# Patient Record
Sex: MALE | Race: WHITE | NOT HISPANIC OR LATINO | Employment: FULL TIME | URBAN - METROPOLITAN AREA
[De-identification: names, ages, dates, MRNs, and addresses within clinical notes are randomized per-mention and may not be internally consistent; named-entity substitution may affect disease eponyms.]

---

## 2018-06-22 ENCOUNTER — APPOINTMENT (OUTPATIENT)
Dept: LAB | Facility: CLINIC | Age: 28
End: 2018-06-22
Payer: COMMERCIAL

## 2018-06-22 ENCOUNTER — OFFICE VISIT (OUTPATIENT)
Dept: FAMILY MEDICINE CLINIC | Facility: CLINIC | Age: 28
End: 2018-06-22
Payer: COMMERCIAL

## 2018-06-22 VITALS
DIASTOLIC BLOOD PRESSURE: 78 MMHG | TEMPERATURE: 97.9 F | HEIGHT: 72 IN | WEIGHT: 174.2 LBS | BODY MASS INDEX: 23.6 KG/M2 | HEART RATE: 76 BPM | SYSTOLIC BLOOD PRESSURE: 120 MMHG | OXYGEN SATURATION: 98 %

## 2018-06-22 DIAGNOSIS — Z00.00 ENCOUNTER FOR PREVENTATIVE ADULT HEALTH CARE EXAMINATION: ICD-10-CM

## 2018-06-22 DIAGNOSIS — Z13.220 SCREENING FOR HYPERLIPIDEMIA: ICD-10-CM

## 2018-06-22 DIAGNOSIS — E55.9 VITAMIN D DEFICIENCY: ICD-10-CM

## 2018-06-22 DIAGNOSIS — G44.89 OTHER HEADACHE SYNDROME: ICD-10-CM

## 2018-06-22 DIAGNOSIS — G47.33 OBSTRUCTIVE SLEEP APNEA SYNDROME: ICD-10-CM

## 2018-06-22 DIAGNOSIS — Z13.1 SCREENING FOR DIABETES MELLITUS: ICD-10-CM

## 2018-06-22 DIAGNOSIS — R53.83 OTHER FATIGUE: ICD-10-CM

## 2018-06-22 DIAGNOSIS — R06.83 SNORING: ICD-10-CM

## 2018-06-22 DIAGNOSIS — R00.0 HEART RATE FAST: ICD-10-CM

## 2018-06-22 DIAGNOSIS — I45.9 SKIPPED HEART BEATS: Primary | ICD-10-CM

## 2018-06-22 DIAGNOSIS — I45.9 SKIPPED HEART BEATS: ICD-10-CM

## 2018-06-22 DIAGNOSIS — Z98.890 HISTORY OF NASAL SURGERY: ICD-10-CM

## 2018-06-22 LAB
25(OH)D3 SERPL-MCNC: 14.7 NG/ML (ref 30–100)
ALBUMIN SERPL BCP-MCNC: 4.8 G/DL (ref 3.5–5)
ALP SERPL-CCNC: 62 U/L (ref 46–116)
ALT SERPL W P-5'-P-CCNC: 32 U/L (ref 12–78)
ANION GAP SERPL CALCULATED.3IONS-SCNC: 8 MMOL/L (ref 4–13)
AST SERPL W P-5'-P-CCNC: 17 U/L (ref 5–45)
BASOPHILS # BLD AUTO: 0.03 THOUSANDS/ΜL (ref 0–0.1)
BASOPHILS NFR BLD AUTO: 1 % (ref 0–1)
BILIRUB SERPL-MCNC: 0.88 MG/DL (ref 0.2–1)
BILIRUB UR QL STRIP: NEGATIVE
BUN SERPL-MCNC: 13 MG/DL (ref 5–25)
CALCIUM SERPL-MCNC: 9.3 MG/DL (ref 8.3–10.1)
CHLORIDE SERPL-SCNC: 103 MMOL/L (ref 100–108)
CHOLEST SERPL-MCNC: 140 MG/DL (ref 50–200)
CLARITY UR: CLEAR
CO2 SERPL-SCNC: 29 MMOL/L (ref 21–32)
COLOR UR: YELLOW
CREAT SERPL-MCNC: 0.84 MG/DL (ref 0.6–1.3)
EOSINOPHIL # BLD AUTO: 0.03 THOUSAND/ΜL (ref 0–0.61)
EOSINOPHIL NFR BLD AUTO: 1 % (ref 0–6)
ERYTHROCYTE [DISTWIDTH] IN BLOOD BY AUTOMATED COUNT: 12.4 % (ref 11.6–15.1)
GFR SERPL CREATININE-BSD FRML MDRD: 120 ML/MIN/1.73SQ M
GLUCOSE P FAST SERPL-MCNC: 83 MG/DL (ref 65–99)
GLUCOSE UR STRIP-MCNC: NEGATIVE MG/DL
HCT VFR BLD AUTO: 49.6 % (ref 36.5–49.3)
HDLC SERPL-MCNC: 44 MG/DL (ref 40–60)
HGB BLD-MCNC: 16.3 G/DL (ref 12–17)
HGB UR QL STRIP.AUTO: NEGATIVE
IMM GRANULOCYTES # BLD AUTO: 0.03 THOUSAND/UL (ref 0–0.2)
IMM GRANULOCYTES NFR BLD AUTO: 1 % (ref 0–2)
KETONES UR STRIP-MCNC: NEGATIVE MG/DL
LDLC SERPL CALC-MCNC: 79 MG/DL (ref 0–100)
LEUKOCYTE ESTERASE UR QL STRIP: NEGATIVE
LYMPHOCYTES # BLD AUTO: 1.53 THOUSANDS/ΜL (ref 0.6–4.47)
LYMPHOCYTES NFR BLD AUTO: 23 % (ref 14–44)
MCH RBC QN AUTO: 28.9 PG (ref 26.8–34.3)
MCHC RBC AUTO-ENTMCNC: 32.9 G/DL (ref 31.4–37.4)
MCV RBC AUTO: 88 FL (ref 82–98)
MONOCYTES # BLD AUTO: 0.47 THOUSAND/ΜL (ref 0.17–1.22)
MONOCYTES NFR BLD AUTO: 7 % (ref 4–12)
NEUTROPHILS # BLD AUTO: 4.53 THOUSANDS/ΜL (ref 1.85–7.62)
NEUTS SEG NFR BLD AUTO: 67 % (ref 43–75)
NITRITE UR QL STRIP: NEGATIVE
NONHDLC SERPL-MCNC: 96 MG/DL
NRBC BLD AUTO-RTO: 0 /100 WBCS
PH UR STRIP.AUTO: 7 [PH] (ref 4.5–8)
PLATELET # BLD AUTO: 280 THOUSANDS/UL (ref 149–390)
PMV BLD AUTO: 9.5 FL (ref 8.9–12.7)
POTASSIUM SERPL-SCNC: 4.4 MMOL/L (ref 3.5–5.3)
PROT SERPL-MCNC: 8.4 G/DL (ref 6.4–8.2)
PROT UR STRIP-MCNC: NEGATIVE MG/DL
RBC # BLD AUTO: 5.64 MILLION/UL (ref 3.88–5.62)
SODIUM SERPL-SCNC: 140 MMOL/L (ref 136–145)
SP GR UR STRIP.AUTO: 1.02 (ref 1–1.03)
TRIGL SERPL-MCNC: 83 MG/DL
TSH SERPL DL<=0.05 MIU/L-ACNC: 0.91 UIU/ML (ref 0.36–3.74)
UROBILINOGEN UR QL STRIP.AUTO: 0.2 E.U./DL
WBC # BLD AUTO: 6.62 THOUSAND/UL (ref 4.31–10.16)

## 2018-06-22 PROCEDURE — 80061 LIPID PANEL: CPT

## 2018-06-22 PROCEDURE — 81003 URINALYSIS AUTO W/O SCOPE: CPT | Performed by: NURSE PRACTITIONER

## 2018-06-22 PROCEDURE — 80053 COMPREHEN METABOLIC PANEL: CPT | Performed by: NURSE PRACTITIONER

## 2018-06-22 PROCEDURE — 85025 COMPLETE CBC W/AUTO DIFF WBC: CPT

## 2018-06-22 PROCEDURE — 99204 OFFICE O/P NEW MOD 45 MIN: CPT | Performed by: NURSE PRACTITIONER

## 2018-06-22 PROCEDURE — 93000 ELECTROCARDIOGRAM COMPLETE: CPT | Performed by: NURSE PRACTITIONER

## 2018-06-22 PROCEDURE — 84443 ASSAY THYROID STIM HORMONE: CPT

## 2018-06-22 PROCEDURE — 82306 VITAMIN D 25 HYDROXY: CPT

## 2018-06-22 PROCEDURE — 36415 COLL VENOUS BLD VENIPUNCTURE: CPT | Performed by: NURSE PRACTITIONER

## 2018-06-22 RX ORDER — MULTIVIT WITH MINERALS/LUTEIN
1000 TABLET ORAL DAILY
COMMUNITY

## 2018-06-22 NOTE — PROGRESS NOTES
Assessment/Plan:    No problem-specific Assessment & Plan notes found for this encounter  Diagnoses and all orders for this visit:    Skipped heart beats  Comments:  EKG completed NSR no ectopy   Echocardiagram ordered  Orders:  -     CBC and differential; Future  -     UA w Reflex to Microscopic w Reflex to Culture  -     POCT ECG  -     Diagnostic Sleep Study; Future  -     Echo complete with contrast if indicated; Future    Heart rate fast  Comments:  EKG completed NSR no ectopy   Echocardiagram ordered  Orders:  -     CBC and differential; Future  -     POCT ECG  -     Diagnostic Sleep Study; Future  -     Echo complete with contrast if indicated; Future    Encounter for preventative adult health care examination  Comments:  Labwork ordered    Screening for hyperlipidemia  Comments:  Labwork ordered  Orders:  -     Lipid panel; Future    Screening for diabetes mellitus  Comments:  Labwork ordered  Orders:  -     Comprehensive metabolic panel    Vitamin D deficiency  Comments:  Labwork ordered  Orders:  -     Vitamin D 25 hydroxy; Future    Other fatigue  Comments:  Labwork ordered  Orders:  -     CBC and differential; Future  -     TSH, 3rd generation with Free T4 reflex; Future  -     UA w Reflex to Microscopic w Reflex to Culture  -     Diagnostic Sleep Study; Future    Obstructive sleep apnea syndrome  Comments:  Sleep study ordered  Orders:  -     Diagnostic Sleep Study; Future    Snoring  Comments:  Sleep study ordered  Orders:  -     Diagnostic Sleep Study; Future    History of nasal surgery  Comments:  Referred to ENT for persistent nasal stuffiness  Orders:  -     Ambulatory Referral to Otolaryngology; Future    Other headache syndrome  Comments:  Sleep study ordered, referral to ENT    Other orders  -     Ascorbic Acid (VITAMIN C) 1000 MG tablet; Take 1,000 mg by mouth daily          Subjective:      Patient ID: Ulises Matamoros is a 32 y o  male  here to [facility]   Pt offers complaint of excess daytime sleepiness, snoring, irregular heartbeat and difficulty getting air in his nose due to hx of nasal fx surgery    HPI    The following portions of the patient's history were reviewed and updated as appropriate:   He  has no past medical history on file  He   Patient Active Problem List    Diagnosis Date Noted    Heart rate fast 06/22/2018    Snoring 06/22/2018    Obstructive sleep apnea syndrome 06/22/2018    History of nasal surgery 06/22/2018    Other headache syndrome 06/22/2018     He  has a past surgical history that includes Appendectomy  His family history includes Bone cancer in his brother; Diabetes in his cousin and maternal grandfather; Thyroid cancer in his maternal grandmother  He  reports that he has never smoked  He has never used smokeless tobacco  He reports that he drinks about 8 4 oz of alcohol per week   He reports that he does not use drugs  Current Outpatient Prescriptions   Medication Sig Dispense Refill    Ascorbic Acid (VITAMIN C) 1000 MG tablet Take 1,000 mg by mouth daily       No current facility-administered medications for this visit       Review of Systems   Constitutional: Negative  Negative for appetite change and fatigue  HENT: Negative  Negative for congestion, ear pain, rhinorrhea and sinus pain  Eyes: Negative  Negative for pain, itching and visual disturbance  Respiratory: Negative  Negative for cough, shortness of breath and wheezing  Cardiovascular: Negative  Negative for chest pain, palpitations and leg swelling  Gastrointestinal: Negative  Negative for abdominal pain, constipation, diarrhea, nausea and vomiting  Endocrine: Negative  Negative for polydipsia, polyphagia and polyuria  Genitourinary: Negative  Negative for difficulty urinating, frequency and urgency  Musculoskeletal: Negative  Negative for back pain and joint swelling  Skin: Negative  Negative for rash and wound  Allergic/Immunologic: Negative      Neurological: Negative  Negative for dizziness, weakness and headaches  Hematological: Negative  Negative for adenopathy  Does not bruise/bleed easily  Psychiatric/Behavioral: Negative  Negative for behavioral problems and sleep disturbance  The patient is not nervous/anxious  Objective:      /78 (BP Location: Left arm, Patient Position: Sitting, Cuff Size: Adult)   Pulse 76   Temp 97 9 °F (36 6 °C) (Tympanic)   Ht 6' (1 829 m)   Wt 79 kg (174 lb 3 2 oz)   SpO2 98%   BMI 23 63 kg/m²          Physical Exam   Constitutional: He is oriented to person, place, and time  He appears well-developed and well-nourished  HENT:   Head: Normocephalic  Eyes: Conjunctivae and EOM are normal  Pupils are equal, round, and reactive to light  Neck: Normal range of motion  Neck supple  Cardiovascular: Normal rate and regular rhythm  Pulmonary/Chest: Effort normal and breath sounds normal    Abdominal: Soft  Bowel sounds are normal    Musculoskeletal: Normal range of motion  Neurological: He is alert and oriented to person, place, and time  Skin: Skin is warm and dry  Psychiatric: He has a normal mood and affect   His behavior is normal  Judgment and thought content normal

## 2018-08-06 DIAGNOSIS — G47.9 FATIGUE DUE TO SLEEP PATTERN DISTURBANCE: Primary | ICD-10-CM

## 2018-08-06 DIAGNOSIS — R53.83 FATIGUE DUE TO SLEEP PATTERN DISTURBANCE: Primary | ICD-10-CM

## 2018-08-21 ENCOUNTER — TELEPHONE (OUTPATIENT)
Dept: UROLOGY | Facility: AMBULATORY SURGERY CENTER | Age: 28
End: 2018-08-21

## 2018-08-21 NOTE — TELEPHONE ENCOUNTER
Patient needs a new patient vasectomy consult appointment  Reason for appointment/Complaint/Diagnosis : consult for a vasectomy    Insurance: [employer]    History of Cancer? no                       If yes, what kind? none    Previous urologist?     no                  Records requested/where? [facility]    Outside testing/where? no    Location Preference for office visit? [facility], [address] new patient paperwork via email

## 2018-08-24 ENCOUNTER — HOSPITAL ENCOUNTER (OUTPATIENT)
Dept: SLEEP CENTER | Facility: HOSPITAL | Age: 28
Discharge: HOME | End: 2018-08-24
Payer: COMMERCIAL

## 2018-08-24 DIAGNOSIS — R53.83 FATIGUE DUE TO SLEEP PATTERN DISTURBANCE: ICD-10-CM

## 2018-08-24 DIAGNOSIS — G47.9 FATIGUE DUE TO SLEEP PATTERN DISTURBANCE: ICD-10-CM

## 2018-08-24 PROCEDURE — G0399 HOME SLEEP TEST/TYPE 3 PORTA: HCPCS

## 2018-08-30 ENCOUNTER — OFFICE VISIT (OUTPATIENT)
Dept: UROLOGY | Facility: AMBULATORY SURGERY CENTER | Age: 28
End: 2018-08-30
Payer: COMMERCIAL

## 2018-08-30 VITALS
HEIGHT: 72 IN | BODY MASS INDEX: 24.65 KG/M2 | DIASTOLIC BLOOD PRESSURE: 76 MMHG | WEIGHT: 182 LBS | HEART RATE: 72 BPM | SYSTOLIC BLOOD PRESSURE: 122 MMHG

## 2018-08-30 DIAGNOSIS — Z30.2 ENCOUNTER FOR STERILIZATION: Primary | ICD-10-CM

## 2018-08-30 DIAGNOSIS — G47.33 OSA (OBSTRUCTIVE SLEEP APNEA): Primary | ICD-10-CM

## 2018-08-30 PROCEDURE — 99243 OFF/OP CNSLTJ NEW/EST LOW 30: CPT | Performed by: UROLOGY

## 2018-08-30 RX ORDER — LORAZEPAM 2 MG/1
2 TABLET ORAL ONCE
Qty: 1 TABLET | Refills: 0 | Status: SHIPPED | OUTPATIENT
Start: 2018-08-30 | End: 2018-08-30

## 2018-08-30 NOTE — PROGRESS NOTES
8/30/2018    Grzegorz Murphy  1990  [id number]        My impression is request for elective sterilization vasectomy  Risk and benefits of the procedure were discussed and reviewed  Informed consent was obtained in the office today  The patient was prescribed Ativan to take one hour prior to the procedure  He understands that he will require transportation to and from the office that day  He also understands he will require 2 semen analyses at 6 and 8 weeks post procedure  In the interim he will require contraception to avoid an undesired pregnancy  History of Present Illness  29 y o  male who requests elective sterilization with vasectomy  He has been  for 1 5 years  He has 1 daughter with his previous wife  She is 5month-old  His current wife has 2 children  The patient states that he and his new wife are absolute in that they do not want to have children together  Risk and benefits of the vasectomy were discussed and reviewed in the office today  The patient understands that vasectomy reversal is not always 100% effective and can carry with it a significant out of pocket expense  He denies any lower urinary tract symptoms  AUA Symptom Score      Review of Systems  Review of Systems   Constitutional: Negative  HENT: Negative  Eyes: Negative  Respiratory: Negative  Cardiovascular: Negative  Gastrointestinal: Negative  Endocrine: Negative  Genitourinary: Negative  Musculoskeletal: Negative  Skin: Negative  Allergic/Immunologic: Negative  Neurological: Negative  Hematological: Negative  Psychiatric/Behavioral: Negative  Past Medical History  History reviewed  No pertinent past medical history      Past Social History  Past Surgical History:   Procedure Laterality Date    APPENDECTOMY      CLOSED REDUCTION NASAL FRACTURE         Past Family History  Family History   Problem Relation Age of Onset    Bone cancer Brother     Thyroid cancer Maternal Grandmother     Diabetes Maternal Grandfather     Diabetes Cousin     COPD Father        Past Social history  Social History     Social History    Marital status: /Civil Union     Spouse name: N/A    Number of children: N/A    Years of education: N/A     Occupational History    Not on file  Social History Main Topics    Smoking status: Never Smoker    Smokeless tobacco: Never Used    Alcohol use 8 4 oz/week     14 Cans of beer per week    Drug use: No    Sexual activity: Yes     Partners: Female     Other Topics Concern    Not on file     Social History Narrative    No narrative on file       Current Medications  Current Outpatient Prescriptions   Medication Sig Dispense Refill    Ascorbic Acid (VITAMIN C) 1000 MG tablet Take 1,000 mg by mouth daily      LORazepam (ATIVAN) 2 mg tablet Take 1 tablet (2 mg total) by mouth once for 1 dose Take one hour prior to procedure 1 tablet 0     No current facility-administered medications for this visit  Allergies  Allergies   Allergen Reactions    Sulfa Antibiotics Hives       Past Medical History, Social History, Family History, medications and allergies were reviewed  Vitals  Vitals:    08/30/18 0833   BP: 122/76   Pulse: 72   Weight: 82 6 kg (182 lb)   Height: 6' (1 829 m)       Physical Exam  Physical Exam    On examination he is in no acute distress  His abdomen is soft nontender nondistended   examination reveals no inguinal hernias  Phallus, scrotum and scrotal contents are normal   Testes are properly descended into the scrotum  Vasa deferentia easily palpable in the midline  Mild left-sided varicoceles appreciated  Skin is warm  Extremities without edema    Neurologic is grossly intact and nonfocal   Gait normal   Affect normal    Results  No results found for: PSA  Lab Results   Component Value Date    CALCIUM 9 3 06/22/2018     06/22/2018    K 4 4 06/22/2018    CO2 29 06/22/2018     06/22/2018    BUN 13 06/22/2018    CREATININE 0 84 06/22/2018     Lab Results   Component Value Date    WBC 6 62 06/22/2018    HGB 16 3 06/22/2018    HCT 49 6 (H) 06/22/2018    MCV 88 06/22/2018     06/22/2018         Office Urine Dip  No results found for this or any previous visit (from the past 1 hour(s)) ]

## 2018-09-07 ENCOUNTER — TRANSCRIBE ORDERS (OUTPATIENT)
Dept: SLEEP CENTER | Facility: CLINIC | Age: 28
End: 2018-09-07

## 2018-10-25 ENCOUNTER — HOSPITAL ENCOUNTER (OUTPATIENT)
Dept: SLEEP CENTER | Facility: HOSPITAL | Age: 28
Discharge: HOME | End: 2018-10-25
Payer: COMMERCIAL

## 2018-10-25 DIAGNOSIS — G47.33 OSA (OBSTRUCTIVE SLEEP APNEA): ICD-10-CM

## 2018-10-25 PROCEDURE — 95811 POLYSOM 6/>YRS CPAP 4/> PARM: CPT

## 2019-05-10 ENCOUNTER — OFFICE VISIT (OUTPATIENT)
Dept: URGENT CARE | Facility: CLINIC | Age: 29
End: 2019-05-10
Payer: COMMERCIAL

## 2019-05-10 VITALS
OXYGEN SATURATION: 96 % | WEIGHT: 186 LBS | SYSTOLIC BLOOD PRESSURE: 110 MMHG | DIASTOLIC BLOOD PRESSURE: 76 MMHG | TEMPERATURE: 98.5 F | BODY MASS INDEX: 25.19 KG/M2 | HEIGHT: 72 IN | HEART RATE: 97 BPM | RESPIRATION RATE: 16 BRPM

## 2019-05-10 DIAGNOSIS — R05.9 COUGH: Primary | ICD-10-CM

## 2019-05-10 PROCEDURE — 99213 OFFICE O/P EST LOW 20 MIN: CPT | Performed by: PHYSICIAN ASSISTANT

## 2019-05-10 RX ORDER — DOXYCYCLINE 100 MG/1
100 TABLET ORAL 2 TIMES DAILY
Qty: 20 TABLET | Refills: 0 | Status: SHIPPED | OUTPATIENT
Start: 2019-05-10 | End: 2019-05-20

## 2019-05-10 RX ORDER — MELATONIN
1000 DAILY
COMMUNITY

## 2019-05-10 RX ORDER — METHYLPREDNISOLONE 4 MG/1
TABLET ORAL
Qty: 1 EACH | Refills: 0 | Status: SHIPPED | OUTPATIENT
Start: 2019-05-10 | End: 2021-11-14 | Stop reason: ALTCHOICE

## 2019-11-28 ENCOUNTER — AMB VIDEO VISIT (OUTPATIENT)
Dept: OTHER | Facility: HOSPITAL | Age: 29
End: 2019-11-28
Payer: COMMERCIAL

## 2019-11-28 PROCEDURE — 99201 PR OFFICE OUTPATIENT NEW 10 MINUTES: CPT | Performed by: FAMILY MEDICINE

## 2019-11-28 NOTE — CARE ANYWHERE EVISITS
Visit Summary for [facility] - Gender: Male - Date of Birth: [id number]  Date: [id number] - Duration: 3 minutes  Patient: Tayo Rogers  Provider: Sergey Vargas    Patient Contact Information  Address  [address] [place] [address]; [address]  [phone]    Visit Topics  sore throat, swollen lymph nodes on right side of neck area, been fighting sinus symptoms for 3 weeks  [Added By: Self - 2019-11-28]    Conversation Transcripts  [0A][0A] [Notification] You are connected with Sergey Vargas, Family Physician [0A][Notification] [facility] [place]  [0A][Notification] [facility] has shared health history  Garry Clark  [0A][Notification] Sergey Vargas has added a   diagnosis/procedure code (see the "Visit Notes" tab)  [0A][Notification] Sergey Vargas has added a prescription (see the "Visit Notes" tab)  [0A]    Diagnosis  Acute sinusitis, unspecified    Procedures    Medications Prescribed    Augmentin  Dose : 1 tablet  Route : oral  Frequency : every 12 hours  Refills : 0  Instructions to the Pharmacist : Substitutions allowed      Provider Notes  [0A][0A] [0A]We strongly encourage you to share the following record of today's visit with your primary care physician  [0A][0A][0A][0A]Contact phone number:[0A][0A][0A][0A]Mode of Communication: Faisal Guzman [0A][0A]HPI: The patient has been having   sinus pain, post nasal drip, discolored mucous for more than a week  No fever, some swollen glands on right and has sore throat  There is a moderate cough but no shortness of breath  The patient has been achy and tired    [0A][0A][0A][0A]PMH:   None[0A][0A]PSH: Septoplasty[0A][0A]Smoking HX: Nonsmoker[0A][0A]Meds: Vit C[0A][0A]Allergies: Sulfa[0A][0A][0A][0A]PE: [0A][0A]Gen: Patient is well appearing and in no acute distress   [0A][0A]Eyes: Normal appearing[0A][0A]Nose:   Congested[0A][0A]Sinuses: Sinus tenderness bilaterally[0A][0A]Pharynx: Normal appearing tonsils and posterior pharynx[0A][0A]Resp: No respiratory distress[0A][0A][0A][0A]Assessment: Sinusitis   Diagnosis Code: acute sinusitis NOS J01 90[0A][0A]    [0A][0A]Plan:  [0A][0A]1  Medication as described below  [0A][0A]2  Discussed options and precautions[0A][0A]3  Recommend nasal saline, Neti Pot, plain Mucinex[0A][0A]4  Sleeping with head/chest elevated can help with sinus   drainage[0A][0A][0A][0A]Antibiotic indication: Continued symptoms over 3 weeks[0A][0A]Antibiotic choice: Augmentin 875mg 1 tab twice daily for 7 days[0A][0A][0A][0A]Follow up:[0A][0A]1  Follow up in 5-7 days if not improving  Discussed precautions  [0A][0A]2  If there are any questions or problems with the prescription, call [phone] anytime for assistance  [0A][0A]3  Please re-connect for another online visit or see an in-person provider should your symptoms worsen or not improving 5-7days  [0A][0A]4  Taking a probiotic (either in pill form or by eating yogurt that contains probiotics) while using antibiotics can help prevent some of the troublesome side effects that antibiotics can sometimes cause [0A][0A]5  Please print a copy of this   note and send it to your regular doctor, or take it to your next visit so it may be included in your medical record   [0A][0A][0A][0A]Patient voiced understanding and agrees to plan [0A][0A][0A][0A]Please see your PCP on an annual basis[0A]    Electronically signed by: Beti Muniz(NPI [id number])

## 2019-12-10 ENCOUNTER — OFFICE VISIT (OUTPATIENT)
Dept: FAMILY MEDICINE CLINIC | Facility: CLINIC | Age: 29
End: 2019-12-10
Payer: COMMERCIAL

## 2019-12-10 VITALS
OXYGEN SATURATION: 98 % | BODY MASS INDEX: 26.03 KG/M2 | SYSTOLIC BLOOD PRESSURE: 118 MMHG | TEMPERATURE: 98.8 F | HEART RATE: 111 BPM | HEIGHT: 72 IN | WEIGHT: 192.2 LBS | DIASTOLIC BLOOD PRESSURE: 78 MMHG

## 2019-12-10 DIAGNOSIS — J01.81 OTHER ACUTE RECURRENT SINUSITIS: ICD-10-CM

## 2019-12-10 DIAGNOSIS — Z98.890 HISTORY OF NASAL SURGERY: ICD-10-CM

## 2019-12-10 DIAGNOSIS — R53.83 FATIGUE, UNSPECIFIED TYPE: Primary | ICD-10-CM

## 2019-12-10 DIAGNOSIS — Z13.1 SCREENING FOR DIABETES MELLITUS: ICD-10-CM

## 2019-12-10 DIAGNOSIS — Z13.29 SCREENING FOR THYROID DISORDER: ICD-10-CM

## 2019-12-10 DIAGNOSIS — Z13.31 DEPRESSION SCREENING NEGATIVE: ICD-10-CM

## 2019-12-10 DIAGNOSIS — J02.0 PHARYNGITIS DUE TO STREPTOCOCCUS SPECIES: Primary | ICD-10-CM

## 2019-12-10 DIAGNOSIS — Z13.220 SCREENING FOR HYPERLIPIDEMIA: ICD-10-CM

## 2019-12-10 DIAGNOSIS — E55.9 VITAMIN D DEFICIENCY: ICD-10-CM

## 2019-12-10 LAB — S PYO AG THROAT QL: NEGATIVE

## 2019-12-10 PROCEDURE — 87880 STREP A ASSAY W/OPTIC: CPT | Performed by: NURSE PRACTITIONER

## 2019-12-10 PROCEDURE — 99213 OFFICE O/P EST LOW 20 MIN: CPT | Performed by: NURSE PRACTITIONER

## 2019-12-10 PROCEDURE — 87070 CULTURE OTHR SPECIMN AEROBIC: CPT | Performed by: NURSE PRACTITIONER

## 2019-12-10 PROCEDURE — 87147 CULTURE TYPE IMMUNOLOGIC: CPT | Performed by: NURSE PRACTITIONER

## 2019-12-10 PROCEDURE — 1036F TOBACCO NON-USER: CPT | Performed by: NURSE PRACTITIONER

## 2019-12-10 PROCEDURE — 3008F BODY MASS INDEX DOCD: CPT | Performed by: NURSE PRACTITIONER

## 2019-12-10 RX ORDER — TRIAMCINOLONE ACETONIDE 40 MG/ML
40 INJECTION, SUSPENSION INTRA-ARTICULAR; INTRAMUSCULAR ONCE
Status: COMPLETED | OUTPATIENT
Start: 2019-12-10 | End: 2019-12-10

## 2019-12-10 RX ORDER — AZITHROMYCIN 250 MG/1
TABLET, FILM COATED ORAL
Qty: 6 TABLET | Refills: 1 | Status: SHIPPED | OUTPATIENT
Start: 2019-12-10 | End: 2019-12-14

## 2019-12-10 RX ORDER — METHYLPREDNISOLONE 4 MG/1
TABLET ORAL
Qty: 21 EACH | Refills: 0 | Status: SHIPPED | OUTPATIENT
Start: 2019-12-10 | End: 2021-11-14 | Stop reason: ALTCHOICE

## 2019-12-10 RX ADMIN — TRIAMCINOLONE ACETONIDE 40 MG: 40 INJECTION, SUSPENSION INTRA-ARTICULAR; INTRAMUSCULAR at 17:30

## 2019-12-10 NOTE — PROGRESS NOTES
Assessment/Plan:    No problem-specific Assessment & Plan notes found for this encounter  Diagnoses and all orders for this visit:    Pharyngitis due to Streptococcus species  Comments:  Rx for Medrol dose pack , POCT Kenalog provided, throat culture obtained and Z pack ordered  Orders:  -     azithromycin (ZITHROMAX) 250 mg tablet; Take 2 tablets today then 1 tablet daily x 4 days  -     triamcinolone acetonide (KENALOG-40) 40 mg/mL injection 40 mg  -     methylPREDNISolone 4 MG tablet therapy pack; Use as directed on package  -     POCT rapid strepA  -     Throat culture; Future  -     Throat culture    BMI 25 0-25 9,adult    History of nasal surgery    Depression screening negative    Other acute recurrent sinusitis  Comments:  Rx for Medrol dose pack , POCT Kenalog provided, throat culture obtained and Z pack ordered  Orders:  -     azithromycin (ZITHROMAX) 250 mg tablet; Take 2 tablets today then 1 tablet daily x 4 days  -     triamcinolone acetonide (KENALOG-40) 40 mg/mL injection 40 mg  -     methylPREDNISolone 4 MG tablet therapy pack; Use as directed on package          Subjective:      Patient ID: Chandler Valle is a 34 y o  male  Sore Throat  Patient complains of sore throat  Associated symptoms include enlarged tonsils, nasal blockage, post nasal drip, sinus and nasal congestion, sore throat, swollen glands and lymphadenopathy  Onset of symptoms was 3 weeks ago, and have been unchanged since that time  He is drinking plenty of fluids  He has not had recent close exposure to someone with proven streptococcal pharyngitis  Pt states he was treated with Augmentin and fever did resolve however swollen lymph glands and sore throat are not improved and are impressive upon visualization  with greenish exudate noted on tonsils  Throat culture obtained, Rx for Z pack provided           The following portions of the patient's history were reviewed and updated as appropriate: He  has a past medical history of Sleep apnea and Vitamin D deficiency  He   Patient Active Problem List    Diagnosis Date Noted    BMI 25 0-25 9,adult 12/10/2019    Depression screening negative 12/10/2019    Other acute recurrent sinusitis 12/10/2019    Pharyngitis due to Streptococcus species 12/10/2019    Fatigue due to sleep pattern disturbance     Heart rate fast 06/22/2018    Snoring 06/22/2018    Obstructive sleep apnea syndrome 06/22/2018    History of nasal surgery 06/22/2018    Other headache syndrome 06/22/2018     He  has a past surgical history that includes Appendectomy and Closed reduction nasal fracture  His family history includes Bone cancer in his brother; COPD in his father; Diabetes in his cousin and maternal grandfather; Thyroid cancer in his maternal grandmother  He  reports that he has never smoked  He has never used smokeless tobacco  He reports that he drinks about 14 0 standard drinks of alcohol per week  He reports that he does not use drugs  Current Outpatient Medications   Medication Sig Dispense Refill    Ascorbic Acid (VITAMIN C) 1000 MG tablet Take 1,000 mg by mouth daily      cholecalciferol (VITAMIN D3) 1,000 units tablet Take 1,000 Units by mouth daily      azithromycin (ZITHROMAX) 250 mg tablet Take 2 tablets today then 1 tablet daily x 4 days 6 tablet 1    LORazepam (ATIVAN) 2 mg tablet Take 1 tablet (2 mg total) by mouth once for 1 dose Take one hour prior to procedure 1 tablet 0    methylPREDNISolone 4 MG tablet therapy pack Use as directed on package (Patient not taking: Reported on 12/10/2019) 1 each 0    methylPREDNISolone 4 MG tablet therapy pack Use as directed on package 21 each 0     No current facility-administered medications for this visit        Current Outpatient Medications on File Prior to Visit   Medication Sig    Ascorbic Acid (VITAMIN C) 1000 MG tablet Take 1,000 mg by mouth daily    cholecalciferol (VITAMIN D3) 1,000 units tablet Take 1,000 Units by mouth daily    LORazepam (ATIVAN) 2 mg tablet Take 1 tablet (2 mg total) by mouth once for 1 dose Take one hour prior to procedure    methylPREDNISolone 4 MG tablet therapy pack Use as directed on package (Patient not taking: Reported on 12/10/2019)     No current facility-administered medications on file prior to visit  He is allergic to sulfa antibiotics       Review of Systems   Constitutional: Negative for chills and fever  HENT: Positive for congestion, hearing loss, postnasal drip, rhinorrhea, sinus pressure, sinus pain, sneezing, sore throat and trouble swallowing  Respiratory: Positive for cough  Neurological: Positive for headaches  BMI Counseling: Body mass index is 26 07 kg/m²  Discussed the patient's BMI with him  The BMI is above normal  Nutrition recommendations include reducing portion sizes, decreasing overall calorie intake, 3-5 servings of fruits/vegetables daily, reducing fast food intake, consuming healthier snacks, decreasing soda and/or juice intake, moderation in carbohydrate intake, increasing intake of lean protein, reducing intake of saturated fat and trans fat and reducing intake of cholesterol  Exercise recommendations include exercising 3-5 times per week  PHQ-9 Depression Screening    PHQ-9:    Frequency of the following problems over the past two weeks:       Little interest or pleasure in doing things:  0 - not at all  Feeling down, depressed, or hopeless:  0 - not at all  PHQ-2 Score:  0        Depression Screening Follow-up Plan: Patient's depression screening was negative with a PHQ-2 score of 0    Clinically patient does not have depression  No treatment is required  Objective:      /78   Pulse (!) 111   Temp 98 8 °F (37 1 °C) (Tympanic)   Ht 6' (1 829 m)   Wt 87 2 kg (192 lb 3 2 oz)   SpO2 98%   BMI 26 07 kg/m²          Physical Exam   Constitutional: He appears ill  HENT:   Right Ear: Tympanic membrane is not erythematous   A middle ear effusion is present  Left Ear: Tympanic membrane is not erythematous  A middle ear effusion is present  Nose: Mucosal edema and rhinorrhea present  Right sinus exhibits no maxillary sinus tenderness and no frontal sinus tenderness  Left sinus exhibits no maxillary sinus tenderness and no frontal sinus tenderness  Mouth/Throat: Posterior oropharyngeal erythema present  Tonsils are 3+ on the right  Tonsils are 3+ on the left  Tonsillar exudate  Pulmonary/Chest: Effort normal and breath sounds normal    Lymphadenopathy:        Head (right side): Submandibular adenopathy present  Head (left side): Submandibular adenopathy present

## 2019-12-14 LAB — BACTERIA THROAT CULT: ABNORMAL

## 2020-01-22 DIAGNOSIS — J11.1 INFLUENZA: Primary | ICD-10-CM

## 2020-01-22 RX ORDER — OSELTAMIVIR PHOSPHATE 75 MG/1
75 CAPSULE ORAL 2 TIMES DAILY
Qty: 10 CAPSULE | Refills: 0 | Status: SHIPPED | OUTPATIENT
Start: 2020-01-22 | End: 2020-01-27

## 2020-04-20 ENCOUNTER — TELEMEDICINE (OUTPATIENT)
Dept: FAMILY MEDICINE CLINIC | Facility: CLINIC | Age: 30
End: 2020-04-20
Payer: COMMERCIAL

## 2020-04-20 DIAGNOSIS — H10.32 ACUTE BACTERIAL CONJUNCTIVITIS OF LEFT EYE: Primary | ICD-10-CM

## 2020-04-20 PROCEDURE — 99213 OFFICE O/P EST LOW 20 MIN: CPT | Performed by: FAMILY MEDICINE

## 2020-04-20 RX ORDER — TOBRAMYCIN 3 MG/ML
2 SOLUTION/ DROPS OPHTHALMIC
Qty: 1 BOTTLE | Refills: 1 | Status: SHIPPED | OUTPATIENT
Start: 2020-04-20

## 2021-04-14 DIAGNOSIS — Z23 ENCOUNTER FOR IMMUNIZATION: ICD-10-CM

## 2021-05-06 ENCOUNTER — IMMUNIZATIONS (OUTPATIENT)
Dept: FAMILY MEDICINE CLINIC | Facility: HOSPITAL | Age: 31
End: 2021-05-06

## 2021-05-06 DIAGNOSIS — Z23 ENCOUNTER FOR IMMUNIZATION: Primary | ICD-10-CM

## 2021-05-06 PROCEDURE — 0001A SARS-COV-2 / COVID-19 MRNA VACCINE (PFIZER-BIONTECH) 30 MCG: CPT

## 2021-05-06 PROCEDURE — 91300 SARS-COV-2 / COVID-19 MRNA VACCINE (PFIZER-BIONTECH) 30 MCG: CPT

## 2021-05-27 ENCOUNTER — IMMUNIZATIONS (OUTPATIENT)
Dept: FAMILY MEDICINE CLINIC | Facility: HOSPITAL | Age: 31
End: 2021-05-27

## 2021-05-27 DIAGNOSIS — Z23 ENCOUNTER FOR IMMUNIZATION: Primary | ICD-10-CM

## 2021-05-27 PROCEDURE — 0002A SARS-COV-2 / COVID-19 MRNA VACCINE (PFIZER-BIONTECH) 30 MCG: CPT

## 2021-05-27 PROCEDURE — 91300 SARS-COV-2 / COVID-19 MRNA VACCINE (PFIZER-BIONTECH) 30 MCG: CPT

## 2021-11-14 ENCOUNTER — OFFICE VISIT (OUTPATIENT)
Dept: URGENT CARE | Facility: CLINIC | Age: 31
End: 2021-11-14
Payer: COMMERCIAL

## 2021-11-14 VITALS
HEART RATE: 85 BPM | HEIGHT: 72 IN | OXYGEN SATURATION: 97 % | TEMPERATURE: 98.3 F | SYSTOLIC BLOOD PRESSURE: 132 MMHG | BODY MASS INDEX: 27.09 KG/M2 | RESPIRATION RATE: 18 BRPM | WEIGHT: 200 LBS | DIASTOLIC BLOOD PRESSURE: 77 MMHG

## 2021-11-14 DIAGNOSIS — L23.7 POISON IVY DERMATITIS: Primary | ICD-10-CM

## 2021-11-14 PROCEDURE — G0382 LEV 3 HOSP TYPE B ED VISIT: HCPCS | Performed by: FAMILY MEDICINE

## 2021-11-14 RX ORDER — PREDNISONE 10 MG/1
TABLET ORAL
Qty: 21 TABLET | Refills: 0 | Status: SHIPPED | OUTPATIENT
Start: 2021-11-14

## 2022-09-09 ENCOUNTER — OFFICE VISIT (OUTPATIENT)
Dept: URGENT CARE | Age: 32
End: 2022-09-09
Payer: COMMERCIAL

## 2022-09-09 VITALS
RESPIRATION RATE: 18 BRPM | OXYGEN SATURATION: 99 % | TEMPERATURE: 96.6 F | HEART RATE: 75 BPM | DIASTOLIC BLOOD PRESSURE: 77 MMHG | SYSTOLIC BLOOD PRESSURE: 125 MMHG

## 2022-09-09 DIAGNOSIS — H10.32 ACUTE BACTERIAL CONJUNCTIVITIS OF LEFT EYE: Primary | ICD-10-CM

## 2022-09-09 PROCEDURE — G0382 LEV 3 HOSP TYPE B ED VISIT: HCPCS

## 2022-09-09 RX ORDER — CIPROFLOXACIN HYDROCHLORIDE 3.5 MG/ML
1 SOLUTION/ DROPS TOPICAL 4 TIMES DAILY
Qty: 1.4 ML | Refills: 0 | Status: SHIPPED | OUTPATIENT
Start: 2022-09-09 | End: 2022-09-16

## 2022-09-09 NOTE — PROGRESS NOTES
[address] [facility]        NAME: Mana Medrano is a 28 y o  male  : 1990    MRN: [id number]  DATE: 2022  TIME: 3:00 PM    Assessment and Plan   Acute bacterial conjunctivitis of left eye [H10 32]  1  Acute bacterial conjunctivitis of left eye  ciprofloxacin (CILOXAN) 0 3 % ophthalmic solution         Patient Instructions     Start opthal drops as directed  Discard previous contact lens  Follow up with PCP in 2-3 days  Proceed to ER if symptoms worsen  Chief Complaint     Chief Complaint   Patient presents with    Eye Problem     Pt presents for left eye irration  Pt states since last night eye has been irritated and red  Woke yp this morning and eye was crusty          History of Present Illness     28 y o  M presents with complaint of right eye redness & crusting x 1 day  Denies visual changes or light sensitivity  Denies itching or watering  Denies trauma or injury  No concern for foreign body  +contact lenses  Denies sick contacts  No URI symptoms or fevers  Review of Systems   Review of Systems   Constitutional: Negative for chills, fatigue and fever  HENT: Negative for congestion, ear discharge, ear pain, facial swelling, rhinorrhea, sinus pressure, sinus pain, sore throat and trouble swallowing  Eyes: Positive for discharge and redness  Negative for photophobia, pain, itching and visual disturbance  Respiratory: Negative for cough, chest tightness, shortness of breath and wheezing  Cardiovascular: Negative for chest pain, palpitations and leg swelling  Gastrointestinal: Negative for abdominal pain, diarrhea, nausea and vomiting  Genitourinary: Negative for dysuria, flank pain and hematuria  Musculoskeletal: Negative for arthralgias, back pain, myalgias and neck pain  Skin: Negative for pallor and wound  Neurological: Negative for dizziness, syncope, weakness, numbness and headaches  Psychiatric/Behavioral: Negative for confusion and sleep disturbance  All other systems reviewed and are negative  Current Medications       Current Outpatient Medications:     ciprofloxacin (CILOXAN) 0 3 % ophthalmic solution, Administer 1 drop into the left eye 4 (four) times a day for 7 days, Disp: 1 4 mL, Rfl: 0    Ascorbic Acid (VITAMIN C) 1000 MG tablet, Take 1,000 mg by mouth daily (Patient not taking: Reported on 11/14/2021 ), Disp: , Rfl:     cholecalciferol (VITAMIN D3) 1,000 units tablet, Take 1,000 Units by mouth daily (Patient not taking: Reported on 11/14/2021 ), Disp: , Rfl:     LORazepam (ATIVAN) 2 mg tablet, Take 1 tablet (2 mg total) by mouth once for 1 dose Take one hour prior to procedure, Disp: 1 tablet, Rfl: 0    predniSONE 10 mg tablet, 6 tablets by mouth on day 1, then 5 tablets on day 2, 4 tablets on day 3, 3 tablets on day 4, 2 tablets on day 5, 1 tablet on day 6 (Patient not taking: Reported on 9/9/2022), Disp: 21 tablet, Rfl: 0    Current Allergies     Allergies as of 09/09/2022 - Reviewed 09/09/2022   Allergen Reaction Noted    Sulfa antibiotics Hives 06/22/2018            The following portions of the patient's history were reviewed and updated as appropriate: allergies, current medications, past family history, past medical history, past social history, past surgical history and problem list      Past Medical History:   Diagnosis Date    Sleep apnea     Vitamin D deficiency        Past Surgical History:   Procedure Laterality Date    APPENDECTOMY      CLOSED REDUCTION NASAL FRACTURE         Family History   Problem Relation Age of Onset    Bone cancer Brother     Thyroid cancer Maternal Grandmother     Diabetes Maternal Grandfather     Diabetes Cousin     COPD Father          Medications have been verified  Objective   /77   Pulse 75   Temp (!) 96 6 °F (35 9 °C) (Tympanic)   Resp 18   SpO2 99%   No LMP for male patient  Physical Exam     Physical Exam  Vitals reviewed     Constitutional:       General: He is not in acute distress  Appearance: He is normal weight  He is not ill-appearing or toxic-appearing  HENT:      Head: Normocephalic  Right Ear: Tympanic membrane normal  No middle ear effusion  Tympanic membrane is not erythematous or bulging  Left Ear: Tympanic membrane normal   No middle ear effusion  Tympanic membrane is not erythematous or bulging  Nose: Nose normal       Right Sinus: No maxillary sinus tenderness or frontal sinus tenderness  Left Sinus: No maxillary sinus tenderness or frontal sinus tenderness  Mouth/Throat:      Mouth: Mucous membranes are moist       Pharynx: Uvula midline  No oropharyngeal exudate, posterior oropharyngeal erythema or uvula swelling  Tonsils: No tonsillar exudate or tonsillar abscesses  Eyes:      General:         Left eye: Discharge present  Extraocular Movements: Extraocular movements intact  Conjunctiva/sclera:      Left eye: Left conjunctiva is injected  Pupils: Pupils are equal, round, and reactive to light  Comments: Pupils 5-6 cm equal BL   Cardiovascular:      Rate and Rhythm: Normal rate and regular rhythm  Pulses: Normal pulses  Heart sounds: Normal heart sounds  Pulmonary:      Effort: Pulmonary effort is normal  No tachypnea or respiratory distress  Breath sounds: Normal breath sounds and air entry  No decreased breath sounds, wheezing, rhonchi or rales  Abdominal:      General: Bowel sounds are normal       Palpations: Abdomen is soft  Tenderness: There is no abdominal tenderness  Musculoskeletal:         General: Normal range of motion  Cervical back: Normal range of motion and neck supple  Lymphadenopathy:      Cervical: No cervical adenopathy  Skin:     General: Skin is warm and dry  Capillary Refill: Capillary refill takes less than 2 seconds  Neurological:      General: No focal deficit present  Mental Status: He is alert        Cranial Nerves: Cranial nerves are intact  No cranial nerve deficit  Sensory: Sensation is intact  Motor: Motor function is intact  Coordination: Coordination is intact  Deep Tendon Reflexes: Reflexes are normal and symmetric

## 2022-11-11 ENCOUNTER — OFFICE VISIT (OUTPATIENT)
Dept: INTERNAL MEDICINE CLINIC | Facility: OTHER | Age: 32
End: 2022-11-11

## 2022-11-11 VITALS
DIASTOLIC BLOOD PRESSURE: 60 MMHG | HEIGHT: 71 IN | TEMPERATURE: 97.4 F | BODY MASS INDEX: 25.11 KG/M2 | OXYGEN SATURATION: 98 % | SYSTOLIC BLOOD PRESSURE: 102 MMHG | WEIGHT: 179.4 LBS | HEART RATE: 73 BPM

## 2022-11-11 DIAGNOSIS — Z13.29 SCREENING FOR THYROID DISORDER: ICD-10-CM

## 2022-11-11 DIAGNOSIS — G47.33 OBSTRUCTIVE SLEEP APNEA SYNDROME: ICD-10-CM

## 2022-11-11 DIAGNOSIS — Z13.1 SCREENING FOR DIABETES MELLITUS: ICD-10-CM

## 2022-11-11 DIAGNOSIS — R35.0 URINARY FREQUENCY: ICD-10-CM

## 2022-11-11 DIAGNOSIS — N45.1 EPIDIDYMITIS: Primary | ICD-10-CM

## 2022-11-11 DIAGNOSIS — Z76.89 ENCOUNTER TO ESTABLISH CARE: ICD-10-CM

## 2022-11-11 DIAGNOSIS — Z13.0 SCREENING FOR DEFICIENCY ANEMIA: ICD-10-CM

## 2022-11-11 DIAGNOSIS — Z13.220 SCREENING FOR LIPID DISORDERS: ICD-10-CM

## 2022-11-11 PROBLEM — R06.83 SNORING: Status: RESOLVED | Noted: 2018-06-22 | Resolved: 2022-11-11

## 2022-11-11 PROBLEM — Z13.31 DEPRESSION SCREENING NEGATIVE: Status: RESOLVED | Noted: 2019-12-10 | Resolved: 2022-11-11

## 2022-11-11 PROBLEM — J02.0 PHARYNGITIS DUE TO STREPTOCOCCUS SPECIES: Status: RESOLVED | Noted: 2019-12-10 | Resolved: 2022-11-11

## 2022-11-11 LAB
SL AMB  POCT GLUCOSE, UA: NEGATIVE
SL AMB LEUKOCYTE ESTERASE,UA: NEGATIVE
SL AMB POCT BILIRUBIN,UA: NEGATIVE
SL AMB POCT BLOOD,UA: NEGATIVE
SL AMB POCT CLARITY,UA: CLEAR
SL AMB POCT COLOR,UA: YELLOW
SL AMB POCT KETONES,UA: NEGATIVE
SL AMB POCT NITRITE,UA: NORMAL
SL AMB POCT PH,UA: 8.5
SL AMB POCT SPECIFIC GRAVITY,UA: 1.02
SL AMB POCT URINE PROTEIN: NEGATIVE
SL AMB POCT UROBILINOGEN: NORMAL

## 2022-11-11 RX ORDER — TOBRAMYCIN AND DEXAMETHASONE 3; 1 MG/ML; MG/ML
SUSPENSION/ DROPS OPHTHALMIC
COMMUNITY
Start: 2022-11-08

## 2022-11-11 RX ORDER — DOXYCYCLINE HYCLATE 100 MG/1
100 CAPSULE ORAL EVERY 12 HOURS SCHEDULED
Qty: 14 CAPSULE | Refills: 0 | Status: SHIPPED | OUTPATIENT
Start: 2022-11-11 | End: 2022-11-18
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
## 2022-11-15 LAB
C TRACH RRNA SPEC QL NAA+PROBE: NEGATIVE
N GONORRHOEA RRNA SPEC QL NAA+PROBE: NEGATIVE

## 2023-02-24 ENCOUNTER — HOSPITAL ENCOUNTER (OUTPATIENT)
Dept: RADIOLOGY | Facility: IMAGING CENTER | Age: 33
End: 2023-02-24

## 2023-02-24 ENCOUNTER — OFFICE VISIT (OUTPATIENT)
Dept: INTERNAL MEDICINE CLINIC | Facility: OTHER | Age: 33
End: 2023-02-24

## 2023-02-24 VITALS
BODY MASS INDEX: 26.6 KG/M2 | SYSTOLIC BLOOD PRESSURE: 110 MMHG | HEIGHT: 71 IN | HEART RATE: 76 BPM | OXYGEN SATURATION: 99 % | DIASTOLIC BLOOD PRESSURE: 70 MMHG | WEIGHT: 190 LBS | TEMPERATURE: 98.6 F

## 2023-02-24 DIAGNOSIS — M25.531 ACUTE PAIN OF RIGHT WRIST: ICD-10-CM

## 2023-02-24 DIAGNOSIS — M25.531 ACUTE PAIN OF RIGHT WRIST: Primary | ICD-10-CM

## 2023-02-24 PROBLEM — N45.1 EPIDIDYMITIS: Status: RESOLVED | Noted: 2022-11-11 | Resolved: 2023-02-24

## 2023-02-24 NOTE — PROGRESS NOTES
Assessment/Plan:    Acute pain of right wrist  We will order an x-ray of the right wrist for further evaluation  Consider referral to hand surgery  Diagnoses and all orders for this visit:    Acute pain of right wrist  -     XR wrist 3+ vw right; Future                Subjective:      Patient ID: Michele Ferguson is a 28 y o  male  Chief Complaint   Patient presents with   • Hand Pain     Pt is having pain in hand and wrist (right hand ) patient states he bowls he hasn't  for a few weeks pt states hes been icing it        44-year-old male seen today with concern for right wrist pain since 1 month  He denies any known etiology for his pain  He bowls once a week however pain did not start immediately after or during bowling  He denies any trauma  Inversion of the wrist produces pain  Hand Pain   There was no injury mechanism  Pertinent negatives include no chest pain or numbness  He has tried NSAIDs for the symptoms  The treatment provided mild relief  The following portions of the patient's history were reviewed and updated as appropriate: allergies, current medications, past family history, past medical history, past social history, past surgical history and problem list     Review of Systems   Constitutional: Negative for activity change, appetite change, chills, diaphoresis, fatigue and fever  HENT: Negative for congestion, postnasal drip, rhinorrhea, sinus pressure, sinus pain, sneezing and sore throat  Eyes: Negative for visual disturbance  Respiratory: Negative for apnea, cough, choking, chest tightness, shortness of breath and wheezing  Cardiovascular: Negative for chest pain, palpitations and leg swelling  Gastrointestinal: Negative for abdominal distention, abdominal pain, anal bleeding, blood in stool, constipation, diarrhea, nausea and vomiting  Endocrine: Negative for cold intolerance and heat intolerance     Genitourinary: Negative for difficulty urinating, dysuria and hematuria  Musculoskeletal: Negative  Skin: Negative  Neurological: Negative for dizziness, weakness, light-headedness, numbness and headaches  Hematological: Negative for adenopathy  Psychiatric/Behavioral: Negative for agitation, sleep disturbance and suicidal ideas  All other systems reviewed and are negative  Past Medical History:   Diagnosis Date   • Sleep apnea    • Vitamin D deficiency        No current outpatient medications on file  Allergies   Allergen Reactions   • Sulfa Antibiotics Hives       Social History   Past Surgical History:   Procedure Laterality Date   • APPENDECTOMY     • CLOSED REDUCTION NASAL FRACTURE       Family History   Problem Relation Age of Onset   • Bone cancer Brother    • Thyroid cancer Maternal Grandmother    • Diabetes Maternal Grandfather    • Diabetes Cousin    • COPD Father        Objective:  /70 (BP Location: Left arm, Patient Position: Sitting, Cuff Size: Large)   Pulse 76   Temp 98 6 °F (37 °C) (Temporal)   Ht 5' 11 26" (1 81 m)   Wt 86 2 kg (190 lb)   SpO2 99%   BMI 26 31 kg/m²     No results found for this or any previous visit (from the past 1344 hour(s))  Physical Exam  Vitals and nursing note reviewed  Constitutional:       General: He is not in acute distress  Appearance: He is well-developed  He is not diaphoretic  HENT:      Head: Normocephalic and atraumatic  Eyes:      General: No scleral icterus  Right eye: No discharge  Left eye: No discharge  Conjunctiva/sclera: Conjunctivae normal       Pupils: Pupils are equal, round, and reactive to light  Neck:      Thyroid: No thyromegaly  Vascular: No JVD  Cardiovascular:      Rate and Rhythm: Normal rate and regular rhythm  Heart sounds: Normal heart sounds  No murmur heard  No friction rub  No gallop  Pulmonary:      Effort: Pulmonary effort is normal  No respiratory distress  Breath sounds: Normal breath sounds   No wheezing or rales  Chest:      Chest wall: No tenderness  Abdominal:      General: Bowel sounds are normal  There is no distension  Palpations: Abdomen is soft  There is no mass  Tenderness: There is no abdominal tenderness  There is no guarding or rebound  Musculoskeletal:         General: No deformity  Normal range of motion  Right wrist: Tenderness and bony tenderness (Lateral) present  Cervical back: Normal range of motion and neck supple  Lymphadenopathy:      Cervical: No cervical adenopathy  Skin:     General: Skin is warm and dry  Coloration: Skin is not pale  Findings: No erythema or rash  Neurological:      Mental Status: He is alert and oriented to person, place, and time  Cranial Nerves: No cranial nerve deficit  Coordination: Coordination normal       Deep Tendon Reflexes: Reflexes are normal and symmetric  Psychiatric:         Behavior: Behavior normal          Thought Content:  Thought content normal          Judgment: Judgment normal

## 2023-02-24 NOTE — ASSESSMENT & PLAN NOTE
We will order an x-ray of the right wrist for further evaluation  Consider referral to hand surgery

## 2023-02-28 ENCOUNTER — TELEPHONE (OUTPATIENT)
Dept: INTERNAL MEDICINE CLINIC | Facility: OTHER | Age: 33
End: 2023-02-28

## 2023-03-07 ENCOUNTER — TELEPHONE (OUTPATIENT)
Dept: INTERNAL MEDICINE CLINIC | Facility: OTHER | Age: 33
End: 2023-03-07

## 2023-03-07 DIAGNOSIS — M25.531 ACUTE PAIN OF RIGHT WRIST: Primary | ICD-10-CM

## 2023-03-07 NOTE — TELEPHONE ENCOUNTER
Patient was called and notified that he can reach out to any [facility]'s PT and schedule an appointment

## 2023-03-07 NOTE — TELEPHONE ENCOUNTER
Patient calling because he received your message about his x-ray results and he wanted to make you aware that he is still having the wrist pain    He stated that you guys had discussed possible ordering an MRI if the x-ray was normal   He would like to know what his next steps are?

## 2024-02-21 PROBLEM — J01.81 OTHER ACUTE RECURRENT SINUSITIS: Status: RESOLVED | Noted: 2019-12-10 | Resolved: 2024-02-21

## 2024-07-15 ENCOUNTER — OFFICE VISIT (OUTPATIENT)
Dept: INTERNAL MEDICINE CLINIC | Facility: OTHER | Age: 34
End: 2024-07-15
Payer: COMMERCIAL

## 2024-07-15 VITALS
TEMPERATURE: 99 F | WEIGHT: 198 LBS | HEART RATE: 64 BPM | SYSTOLIC BLOOD PRESSURE: 110 MMHG | BODY MASS INDEX: 27.41 KG/M2 | OXYGEN SATURATION: 98 % | DIASTOLIC BLOOD PRESSURE: 68 MMHG

## 2024-07-15 DIAGNOSIS — M62.830 SPASM OF LEFT TRAPEZIUS MUSCLE: Primary | ICD-10-CM

## 2024-07-15 PROCEDURE — 99213 OFFICE O/P EST LOW 20 MIN: CPT | Performed by: INTERNAL MEDICINE

## 2024-07-15 NOTE — PROGRESS NOTES
INTERNAL MEDICINE OFFICE VISIT     PATIENT INFORMATION     Alexis Rowan   33 y.o. male   MRN: [id number]    ASSESSMENT/PLAN     Diagnoses and all orders for this visit:    Spasm of left trapezius muscle  Stretches provided  Conservative management with heat/ice tylenol, posture awareness, stop using daily NSAID    Lymphadenopathy, resolved  C/o right-sided cyst of chin on beard line x1 week. Resolving, no cyst, absecess, drainage, redness, or erythema  No signs of dental infection    Schedule a follow-up appointment in 6 months for annual physical .    HEALTH MAINTENANCE     Immunization History   Administered Date(s) Administered    COVID-19 PFIZER VACCINE 0.3 ML IM 05/06/2021, 05/27/2021    Tdap 10/31/2017     CHIEF COMPLAINT     Chief Complaint   Patient presents with    Mass     Noticed last week, right side of neck, tender     Shoulder Pain     Left shoulder pain, ongoing since May - reports causes numbness down to elbow    HM     PHQ      HISTORY OF PRESENT ILLNESS     Alexis Rowan is a 33 y.o. male for lump under chin and left shoulder pain. Pt states he noticed a lump under his right chin 1 week ago that was tender to palpation.  He denies any drainage or noticeable erythema. Lump has been improving. No fever chills. No difficulty swallowing. No dental pain. No insect bite. Also c/o of left trapezius pain x2 months. Has tried heat/ice and motrin with minimal improvement. 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 No neck pain.     REVIEW OF SYSTEMS     Review of Systems  OBJECTIVE     Vitals:    07/15/24 1532   BP: 110/68   BP Location: Left arm   Patient Position: Sitting   Cuff Size: Standard   Pulse: 64   Temp: 99 °F (37.2 °C)   TempSrc: Temporal   SpO2: 98%   Weight: 89.8 kg (198 lb)     Physical Exam  Constitutional:       General: He is not in acute distress.     Appearance: He is not ill-appearing or toxic-appearing.   HENT:      Head: Normocephalic.      Jaw: No trismus,  tenderness, swelling, pain on movement or malocclusion.      Salivary Glands: Right salivary gland is not diffusely enlarged or tender. Left salivary gland is not diffusely enlarged or tender.      Mouth/Throat:      Mouth: Mucous membranes are moist.      Pharynx: No oropharyngeal exudate or posterior oropharyngeal erythema.   Pulmonary:      Effort: Pulmonary effort is normal. No respiratory distress.   Musculoskeletal:      Comments: Left trapezius hypertonicity    Skin:     General: Skin is warm and dry.   Neurological:      Mental Status: He is alert.       CURRENT MEDICATIONS   No current outpatient medications on file.    PAST MEDICAL & SURGICAL HISTORY     Past Medical History:   Diagnosis Date    Allergic     GERD (gastroesophageal reflux disease)     Sleep apnea     Vitamin D deficiency      Past Surgical History:   Procedure Laterality Date    APPENDECTOMY      CLOSED REDUCTION NASAL FRACTURE       SOCIAL & FAMILY HISTORY     Social History     Socioeconomic History    Marital status:      Spouse name: Not on file    Number of children: Not on file    Years of education: Not on file    Highest education level: Not on file   Occupational History    Not on file   Tobacco Use    Smoking status: Never    Smokeless tobacco: Never   Vaping Use    Vaping status: Never Used   Substance and Sexual Activity    Alcohol use: Yes     Alcohol/week: 14.0 standard drinks of alcohol     Types: 14 Cans of beer per week     Comment: occasionally     Drug use: No    Sexual activity: Yes     Partners: Female   Other Topics Concern    Not on file   Social History Narrative    Not on file     Social Determinants of Health     Financial Resource Strain: Not on file   Food Insecurity: Not on file   Transportation Needs: Not on file   Physical Activity: Not on file   Stress: Not on file   Social Connections: Not on file   Intimate Partner Violence: Not on file   Housing Stability: Not on file     Social History      Substance and Sexual Activity   Alcohol Use Yes    Alcohol/week: 14.0 standard drinks of alcohol    Types: 14 Cans of beer per week    Comment: occasionally        Social History     Substance and Sexual Activity   Drug Use No     Social History     Tobacco Use   Smoking Status Never   Smokeless Tobacco Never     Family History   Problem Relation Age of Onset    Bone cancer Brother     Thyroid cancer Maternal Grandmother     Diabetes Maternal Grandfather     Diabetes Cousin     COPD Father     Stroke Paternal Grandfather     Diabetes Paternal Grandfather          ==  Hanny Celis  Internal Medicine Resident, PGY-3  [facility] Internal Medicine Residency

## 2024-07-31 ENCOUNTER — OFFICE VISIT (OUTPATIENT)
Dept: INTERNAL MEDICINE CLINIC | Facility: OTHER | Age: 34
End: 2024-07-31
Payer: COMMERCIAL

## 2024-07-31 VITALS
OXYGEN SATURATION: 99 % | DIASTOLIC BLOOD PRESSURE: 70 MMHG | WEIGHT: 193.2 LBS | TEMPERATURE: 98 F | SYSTOLIC BLOOD PRESSURE: 132 MMHG | HEIGHT: 71 IN | BODY MASS INDEX: 27.05 KG/M2 | HEART RATE: 72 BPM

## 2024-07-31 DIAGNOSIS — Z00.00 ANNUAL PHYSICAL EXAM: Primary | ICD-10-CM

## 2024-07-31 DIAGNOSIS — K21.9 GASTROESOPHAGEAL REFLUX DISEASE, UNSPECIFIED WHETHER ESOPHAGITIS PRESENT: ICD-10-CM

## 2024-07-31 DIAGNOSIS — G47.33 OBSTRUCTIVE SLEEP APNEA SYNDROME: ICD-10-CM

## 2024-07-31 DIAGNOSIS — R59.0 CERVICAL LYMPHADENOPATHY: ICD-10-CM

## 2024-07-31 DIAGNOSIS — E55.9 VITAMIN D DEFICIENCY: ICD-10-CM

## 2024-07-31 DIAGNOSIS — Z13.228 SCREENING FOR METABOLIC DISORDER: ICD-10-CM

## 2024-07-31 DIAGNOSIS — Z11.59 NEED FOR HEPATITIS C SCREENING TEST: ICD-10-CM

## 2024-07-31 DIAGNOSIS — Z13.220 SCREENING FOR LIPID DISORDERS: ICD-10-CM

## 2024-07-31 DIAGNOSIS — Z11.4 SCREENING FOR HIV (HUMAN IMMUNODEFICIENCY VIRUS): ICD-10-CM

## 2024-07-31 PROCEDURE — 99214 OFFICE O/P EST MOD 30 MIN: CPT

## 2024-07-31 PROCEDURE — 99395 PREV VISIT EST AGE 18-39: CPT

## 2024-07-31 RX ORDER — OMEPRAZOLE 20 MG/1
20 CAPSULE, DELAYED RELEASE ORAL DAILY
Qty: 20 CAPSULE | Refills: 0 | Status: SHIPPED | OUTPATIENT
Start: 2024-07-31

## 2024-07-31 NOTE — PROGRESS NOTES
Adult Annual Physical  Name: Alexis Rowan      : 1990      MRN: [id number]  Encounter Provider: Becky Steev PA-C  Encounter Date: 2024   Encounter department: [facility] [place]    Assessment & Plan   1. Annual physical exam  Assessment & Plan:  Healthy 33-year-old male  Discussed healthy diet and exercise habits  Discussed appropriate age based screenings  Immunizations reviewed- up-to-date  Routine fasting labs ordered    2. Cervical lymphadenopathy  Assessment & Plan:  Notes 3 to 4 weeks of mass to the R side beard line  Notes it has decreased in size originally, now persistent in size over the last 2 to 3 weeks  Very small, nontender, mobile  Will get ultrasound for further evaluation  Orders:  -     US head neck soft tissue; Future; Expected date: 2024  3. Gastroesophageal reflux disease, unspecified whether esophagitis present  Assessment & Plan:  Noting GERD symptoms x 2 years  Notes he does eat foods that he knows are triggers including spicy foods, tomato sauce  Recommend small frequent meals throughout the day.  Avoid aggravating foods, such as spicy foods, acidic foods (ex. tomato and citrus).  Avoid alcohol.  Do not lay down for at least 30 mins after eating.      Will trial omeprazole daily x 2 weeks  F/u 4 weeks   Orders:  -     omeprazole (PriLOSEC) 20 mg delayed release capsule; Take 1 capsule (20 mg total) by mouth daily  4. Obstructive sleep apnea syndrome  Assessment & Plan:  Mild ERIKA, has not been using CPAP  5. Vitamin D deficiency  Assessment & Plan:  History of vitamin D deficiency, not currently on supplementation  Will get updated labs  Orders:  -     Vitamin D 25 hydroxy; Future  6. Need for hepatitis C screening test  -     Hepatitis C Antibody; Future  7. Screening for HIV (human immunodeficiency virus)  -     HIV 1/2 AG/AB w Reflex [facility] for 2 yr old and above; Future  8. Screening for metabolic disorder  -     Comprehensive  metabolic panel; Future  -     TSH, 3rd generation with Free T4 reflex; Future  -     CBC and differential; Future  9. Screening for lipid disorders  -     Lipid Panel with Direct LDL reflex; Future  Immunizations and preventive care screenings were discussed with patient today. Appropriate education was printed on patient's after visit summary.    Counseling:  Alcohol/drug use: discussed moderation in alcohol intake, the recommendations for healthy alcohol use, and avoidance of illicit drug use.  Dental Health: discussed importance of regular tooth brushing, flossing, and dental visits.  Injury prevention: discussed safety/seat belts, safety helmets, smoke detectors, carbon dioxide detectors, and smoking near bedding or upholstery.  Sexual health: discussed sexually transmitted diseases, partner selection, use of condoms, avoidance of unintended pregnancy, and contraceptive alternatives.  Exercise: the importance of regular exercise/physical activity was discussed. Recommend exercise 3-5 times per week for at least 30 minutes.          History of Present Illness     Adult Annual Physical:  Patient presents for annual physical. Swollen lymph nofr in neck x 3-4 weeks  Right side of the neck  Consistent in size   Initially cuasing some tenderness, no tenderness currently .     Diet and Physical Activity:  - Diet/Nutrition: well balanced diet and consuming 3-5 servings of fruits/vegetables daily.  - Exercise: 3-4 times a week on average, moderate cardiovascular exercise and strength training exercises.    General Health:  - Sleep: 7-8 hours of sleep on average. hx of sleep apnea  - Hearing: normal hearing bilateral ears.  - Vision: wears glasses and contacts and goes for regular eye exams.  - Dental: regular dental visits and brushes teeth twice daily.     Health:  - History of STDs: no.   - Urinary symptoms: none.             Review of Systems   Constitutional:  Negative for chills, fatigue and fever.   HENT:   "Negative for congestion, ear pain, postnasal drip, rhinorrhea, sinus pressure, sore throat and trouble swallowing.    Eyes:  Negative for pain and visual disturbance.   Respiratory:  Negative for cough, chest tightness, shortness of breath and wheezing.    Cardiovascular:  Negative for chest pain, palpitations and leg swelling.   Gastrointestinal:  Negative for abdominal pain, blood in stool, nausea and vomiting.   Genitourinary:  Negative for difficulty urinating, dysuria and hematuria.   Musculoskeletal:  Negative for arthralgias and back pain.   Skin:  Negative for color change, rash and wound.   Neurological:  Negative for dizziness, weakness, light-headedness, numbness and headaches.   Psychiatric/Behavioral:  Negative for dysphoric mood and sleep disturbance. The patient is not nervous/anxious.    All other systems reviewed and are negative.    Medical History Reviewed by provider this encounter:  Tobacco  Allergies  Meds  Problems  Med Hx  Surg Hx  Fam Hx         Objective     /70 (BP Location: Left arm, Patient Position: Sitting, Cuff Size: Standard)   Pulse 72   Temp 98 °F (36.7 °C) (Temporal)   Ht 5' 11.26\" (1.81 m)   Wt 87.6 kg (193 lb 3.2 oz)   SpO2 99%   BMI 26.75 kg/m²     Physical Exam  Vitals and nursing note reviewed.   Constitutional:       General: He is not in acute distress.     Appearance: Normal appearance. He is not ill-appearing.   HENT:      Head: Normocephalic and atraumatic.      Right Ear: Tympanic membrane, ear canal and external ear normal.      Left Ear: Tympanic membrane, ear canal and external ear normal.      Nose: Nose normal. No rhinorrhea.      Mouth/Throat:      Mouth: Mucous membranes are moist.      Pharynx: Oropharynx is clear. No oropharyngeal exudate or posterior oropharyngeal erythema.   Eyes:      General: No scleral icterus.     Extraocular Movements: Extraocular movements intact.      Conjunctiva/sclera: Conjunctivae normal.      Pupils: Pupils are " equal, round, and reactive to light.   Neck:      Thyroid: No thyroid mass or thyromegaly.     Cardiovascular:      Rate and Rhythm: Normal rate and regular rhythm.      Heart sounds: Normal heart sounds. No murmur heard.     No friction rub. No gallop.   Pulmonary:      Effort: Pulmonary effort is normal. No respiratory distress.      Breath sounds: Normal breath sounds. No wheezing, rhonchi or rales.   Chest:      Chest wall: No tenderness.   Abdominal:      General: Abdomen is flat.      Palpations: Abdomen is soft.      Tenderness: There is no abdominal tenderness.   Genitourinary:     Comments: Patient declined    Musculoskeletal:      Cervical back: Normal range of motion. No tenderness.      Right lower leg: No edema.      Left lower leg: No edema.   Lymphadenopathy:      Cervical: Cervical adenopathy present.   Skin:     General: Skin is warm and dry.      Coloration: Skin is not pale.      Findings: No erythema, lesion or rash.   Neurological:      General: No focal deficit present.      Mental Status: He is alert and oriented to person, place, and time. Mental status is at baseline.   Psychiatric:         Mood and Affect: Mood normal.         Behavior: Behavior normal.

## 2024-07-31 NOTE — PATIENT INSTRUCTIONS
"Patient Education     Routine physical for adults   The Basics   Written by the doctors and editors at [facility]   What is a physical? -- A physical is a routine visit, or \"check-up,\" with your doctor. You might also hear it called a \"wellness visit\" or \"preventive visit.\"  During each visit, the doctor will:   Ask about your physical and mental health   Ask about your habits, behaviors, and lifestyle   Do an exam   Give you vaccines if needed   Talk to you about any medicines you take   Give advice about your health   Answer your questions  Getting regular check-ups is an important part of taking care of your health. It can help your doctor find and treat any problems you have. But it's also important for preventing health problems.  A routine physical is different from a \"sick visit.\" A sick visit is when you see a doctor because of a health concern or problem. Since physicals are scheduled ahead of time, you can think about what you want to ask the doctor.  How often should I get a physical? -- It depends on your age and health. In general, for people age 21 years and older:   If you are younger than 50 years, you might be able to get a physical every 3 years.   If you are 50 years or older, your doctor might recommend a physical every year.  If you have an ongoing health condition, like diabetes or high blood pressure, your doctor will probably want to see you more often.  What happens during a physical? -- In general, each visit will include:   Physical exam - The doctor or nurse will check your height, weight, heart rate, and blood pressure. They will also look at your eyes and ears. They will ask about how you are feeling and whether you have any symptoms that bother you.   Medicines - It's a good idea to bring a list of all the medicines you take to each doctor visit. Your doctor will talk to you about your medicines and answer any questions. Tell them if you are having any side effects that bother you. You " "should also tell them if you are having trouble paying for any of your medicines.   Habits and behaviors - This includes:   Your diet   Your exercise habits   Whether you smoke, drink alcohol, or use drugs   Whether you are sexually active   Whether you feel safe at home  Your doctor will talk to you about things you can do to improve your health and lower your risk of health problems. They will also offer help and support. For example, if you want to quit smoking, they can give you advice and might prescribe medicines. If you want to improve your diet or get more physical activity, they can help you with this, too.   Lab tests, if needed - The tests you get will depend on your age and situation. For example, your doctor might want to check your:   Cholesterol   Blood sugar   Iron level   Vaccines - The recommended vaccines will depend on your age, health, and what vaccines you already had. Vaccines are very important because they can prevent certain serious or deadly infections.   Discussion of screening - \"Screening\" means checking for diseases or other health problems before they cause symptoms. Your doctor can recommend screening based on your age, risk, and preferences. This might include tests to check for:   Cancer, such as breast, prostate, cervical, ovarian, colorectal, prostate, lung, or skin cancer   Sexually transmitted infections, such as chlamydia and gonorrhea   Mental health conditions like depression and anxiety  Your doctor will talk to you about the different types of screening tests. They can help you decide which screenings to have. They can also explain what the results might mean.   Answering questions - The physical is a good time to ask the doctor or nurse questions about your health. If needed, they can refer you to other doctors or specialists, too.  Adults older than 65 years often need other care, too. As you get older, your doctor will talk to you about:   How to prevent falling at " home   Hearing or vision tests   Memory testing   How to take your medicines safely   Making sure that you have the help and support you need at home  All topics are updated as new evidence becomes available and our peer review process is complete.  This topic retrieved from [employer] on: May 02, 2024.  Topic 149076 Version 1.0  Release: 32.4.3 - C32.122  © 2024 UpToDate, Inc. and/or its affiliates. All rights reserved.  Consumer Information Use and Disclaimer   Disclaimer: This generalized information is a limited summary of diagnosis, treatment, and/or medication information. It is not meant to be comprehensive and should be used as a tool to help the user understand and/or assess potential diagnostic and treatment options. It does NOT include all information about conditions, treatments, medications, side effects, or risks that may apply to a specific patient. It is not intended to be medical advice or a substitute for the medical advice, diagnosis, or treatment of a health care provider based on the health care provider's examination and assessment of a patient's specific and unique circumstances. Patients must speak with a health care provider for complete information about their health, medical questions, and treatment options, including any risks or benefits regarding use of medications. This information does not endorse any treatments or medications as safe, effective, or approved for treating a specific patient. UpToDate, Inc. and its affiliates disclaim any warranty or liability relating to this information or the use thereof.The use of this information is governed by the Terms of Use, available at https://www.[employer].com/en/know/clinical-effectiveness-terms. 2024© UpToDate, Inc. and its affiliates and/or licensors. All rights reserved.  Copyright   © 2024 UpToDate, Inc. and/or its affiliates. All rights reserved.

## 2024-07-31 NOTE — ASSESSMENT & PLAN NOTE
Healthy 33-year-old male  Discussed healthy diet and exercise habits  Discussed appropriate age based screenings  Immunizations reviewed- up-to-date  Routine fasting labs ordered

## 2024-07-31 NOTE — ASSESSMENT & PLAN NOTE
Notes 3 to 4 weeks of mass to the R side beard line  Notes it has decreased in size originally, now persistent in size over the last 2 to 3 weeks  Very small, nontender, mobile  Will get ultrasound for further evaluation

## 2024-07-31 NOTE — ASSESSMENT & PLAN NOTE
Noting GERD symptoms x 2 years  Notes he does eat foods that he knows are triggers including spicy foods, tomato sauce  Recommend small frequent meals throughout the day.  Avoid aggravating foods, such as spicy foods, acidic foods (ex. tomato and citrus).  Avoid alcohol.  Do not lay down for at least 30 mins after eating.      Will trial omeprazole daily x 2 weeks  F/u 4 weeks

## 2024-08-05 ENCOUNTER — HOSPITAL ENCOUNTER (OUTPATIENT)
Dept: RADIOLOGY | Age: 34
Discharge: HOME | End: 2024-08-05
Payer: COMMERCIAL

## 2024-08-05 DIAGNOSIS — R59.0 CERVICAL LYMPHADENOPATHY: ICD-10-CM

## 2024-08-05 PROCEDURE — 76536 US EXAM OF HEAD AND NECK: CPT

## 2024-08-07 ENCOUNTER — TELEPHONE (OUTPATIENT)
Age: 34
End: 2024-08-07

## 2024-08-07 DIAGNOSIS — R59.0 CERVICAL LYMPHADENOPATHY: Primary | ICD-10-CM

## 2024-08-07 DIAGNOSIS — R93.89 ABNORMAL ULTRASOUND: ICD-10-CM

## 2024-08-07 NOTE — TELEPHONE ENCOUNTER
Patient was returning Becky's voicemail to go over ultrasound results with the patient.  Please advise.

## 2024-08-08 ENCOUNTER — APPOINTMENT (OUTPATIENT)
Dept: LAB | Facility: IMAGING CENTER | Age: 34
End: 2024-08-08
Payer: COMMERCIAL

## 2024-08-08 DIAGNOSIS — Z13.228 SCREENING FOR METABOLIC DISORDER: ICD-10-CM

## 2024-08-08 DIAGNOSIS — Z11.59 NEED FOR HEPATITIS C SCREENING TEST: ICD-10-CM

## 2024-08-08 DIAGNOSIS — E55.9 VITAMIN D DEFICIENCY: ICD-10-CM

## 2024-08-08 DIAGNOSIS — Z11.4 SCREENING FOR HIV (HUMAN IMMUNODEFICIENCY VIRUS): ICD-10-CM

## 2024-08-08 DIAGNOSIS — Z13.220 SCREENING FOR LIPID DISORDERS: ICD-10-CM

## 2024-08-08 LAB
25(OH)D3 SERPL-MCNC: 35.3 NG/ML (ref 30–100)
ALBUMIN SERPL BCG-MCNC: 4.5 G/DL (ref 3.5–5)
ALP SERPL-CCNC: 54 U/L (ref 34–104)
ALT SERPL W P-5'-P-CCNC: 18 U/L (ref 7–52)
ANION GAP SERPL CALCULATED.3IONS-SCNC: 8 MMOL/L (ref 4–13)
AST SERPL W P-5'-P-CCNC: 16 U/L (ref 13–39)
BASOPHILS # BLD AUTO: 0.04 THOUSANDS/ÂΜL (ref 0–0.1)
BASOPHILS NFR BLD AUTO: 1 % (ref 0–1)
BILIRUB SERPL-MCNC: 0.59 MG/DL (ref 0.2–1)
BUN SERPL-MCNC: 12 MG/DL (ref 5–25)
CALCIUM SERPL-MCNC: 9.4 MG/DL (ref 8.4–10.2)
CHLORIDE SERPL-SCNC: 101 MMOL/L (ref 96–108)
CHOLEST SERPL-MCNC: 160 MG/DL
CO2 SERPL-SCNC: 31 MMOL/L (ref 21–32)
CREAT SERPL-MCNC: 0.82 MG/DL (ref 0.6–1.3)
EOSINOPHIL # BLD AUTO: 0.07 THOUSAND/ÂΜL (ref 0–0.61)
EOSINOPHIL NFR BLD AUTO: 1 % (ref 0–6)
ERYTHROCYTE [DISTWIDTH] IN BLOOD BY AUTOMATED COUNT: 12.2 % (ref 11.6–15.1)
GFR SERPL CREATININE-BSD FRML MDRD: 116 ML/MIN/1.73SQ M
GLUCOSE P FAST SERPL-MCNC: 85 MG/DL (ref 65–99)
HCT VFR BLD AUTO: 47.9 % (ref 36.5–49.3)
HCV AB SER QL: NORMAL
HDLC SERPL-MCNC: 49 MG/DL
HGB BLD-MCNC: 15.4 G/DL (ref 12–17)
HIV 1+2 AB+HIV1 P24 AG SERPL QL IA: NORMAL
HIV 2 AB SERPL QL IA: NORMAL
HIV1 AB SERPL QL IA: NORMAL
HIV1 P24 AG SERPL QL IA: NORMAL
IMM GRANULOCYTES # BLD AUTO: 0.05 THOUSAND/UL (ref 0–0.2)
IMM GRANULOCYTES NFR BLD AUTO: 1 % (ref 0–2)
LDLC SERPL CALC-MCNC: 84 MG/DL (ref 0–100)
LYMPHOCYTES # BLD AUTO: 2.58 THOUSANDS/ÂΜL (ref 0.6–4.47)
LYMPHOCYTES NFR BLD AUTO: 38 % (ref 14–44)
MCH RBC QN AUTO: 29.5 PG (ref 26.8–34.3)
MCHC RBC AUTO-ENTMCNC: 32.2 G/DL (ref 31.4–37.4)
MCV RBC AUTO: 92 FL (ref 82–98)
MONOCYTES # BLD AUTO: 0.53 THOUSAND/ÂΜL (ref 0.17–1.22)
MONOCYTES NFR BLD AUTO: 8 % (ref 4–12)
NEUTROPHILS # BLD AUTO: 3.57 THOUSANDS/ÂΜL (ref 1.85–7.62)
NEUTS SEG NFR BLD AUTO: 51 % (ref 43–75)
NRBC BLD AUTO-RTO: 0 /100 WBCS
PLATELET # BLD AUTO: 277 THOUSANDS/UL (ref 149–390)
PMV BLD AUTO: 9.9 FL (ref 8.9–12.7)
POTASSIUM SERPL-SCNC: 4.3 MMOL/L (ref 3.5–5.3)
PROT SERPL-MCNC: 7.4 G/DL (ref 6.4–8.4)
RBC # BLD AUTO: 5.22 MILLION/UL (ref 3.88–5.62)
SODIUM SERPL-SCNC: 140 MMOL/L (ref 135–147)
TRIGL SERPL-MCNC: 135 MG/DL
TSH SERPL DL<=0.05 MIU/L-ACNC: 1.54 UIU/ML (ref 0.45–4.5)
WBC # BLD AUTO: 6.84 THOUSAND/UL (ref 4.31–10.16)

## 2024-08-08 PROCEDURE — 36415 COLL VENOUS BLD VENIPUNCTURE: CPT

## 2024-08-08 PROCEDURE — 80061 LIPID PANEL: CPT

## 2024-08-08 PROCEDURE — 84443 ASSAY THYROID STIM HORMONE: CPT

## 2024-08-08 PROCEDURE — 87389 HIV-1 AG W/HIV-1&-2 AB AG IA: CPT

## 2024-08-08 PROCEDURE — 82306 VITAMIN D 25 HYDROXY: CPT

## 2024-08-08 PROCEDURE — 85025 COMPLETE CBC W/AUTO DIFF WBC: CPT

## 2024-08-08 PROCEDURE — 80053 COMPREHEN METABOLIC PANEL: CPT

## 2024-08-08 PROCEDURE — 86803 HEPATITIS C AB TEST: CPT

## 2024-08-20 ENCOUNTER — TELEPHONE (OUTPATIENT)
Age: 34
End: 2024-08-20

## 2024-08-20 DIAGNOSIS — R59.0 CERVICAL LYMPHADENOPATHY: Primary | ICD-10-CM

## 2024-08-20 NOTE — TELEPHONE ENCOUNTER
Meche from [facility] called to let Becky Steve know that she did not sign the script for patients US guided lymph node biopsy right (Order [id number]). If you could either sign or re-enter the order. His appt is on 8/27/24.    Thanks

## 2024-08-27 ENCOUNTER — HOSPITAL ENCOUNTER (OUTPATIENT)
Dept: RADIOLOGY | Facility: HOSPITAL | Age: 34
Discharge: HOME | End: 2024-08-27
Payer: COMMERCIAL

## 2024-08-27 DIAGNOSIS — R59.0 CERVICAL LYMPHADENOPATHY: ICD-10-CM

## 2024-08-27 DIAGNOSIS — R93.89 ABNORMAL ULTRASOUND: ICD-10-CM

## 2024-08-27 PROCEDURE — 88185 FLOWCYTOMETRY/TC ADD-ON: CPT

## 2024-08-27 PROCEDURE — 88173 CYTOPATH EVAL FNA REPORT: CPT | Performed by: PATHOLOGY

## 2024-08-27 PROCEDURE — 10005 FNA BX W/US GDN 1ST LES: CPT

## 2024-08-27 PROCEDURE — 38505 NEEDLE BIOPSY LYMPH NODES: CPT

## 2024-08-27 PROCEDURE — 88184 FLOWCYTOMETRY/ TC 1 MARKER: CPT

## 2024-08-27 RX ORDER — LIDOCAINE HYDROCHLORIDE 10 MG/ML
2 INJECTION, SOLUTION EPIDURAL; INFILTRATION; INTRACAUDAL; PERINEURAL ONCE
Status: COMPLETED | OUTPATIENT
Start: 2024-08-27 | End: 2024-08-27

## 2024-08-27 RX ADMIN — LIDOCAINE HYDROCHLORIDE 2 ML: 10 INJECTION, SOLUTION EPIDURAL; INFILTRATION; INTRACAUDAL; PERINEURAL at 10:40

## 2024-08-28 ENCOUNTER — OFFICE VISIT (OUTPATIENT)
Dept: INTERNAL MEDICINE CLINIC | Facility: OTHER | Age: 34
End: 2024-08-28
Payer: COMMERCIAL

## 2024-08-28 VITALS
HEART RATE: 84 BPM | TEMPERATURE: 98.9 F | DIASTOLIC BLOOD PRESSURE: 70 MMHG | OXYGEN SATURATION: 98 % | BODY MASS INDEX: 27.16 KG/M2 | SYSTOLIC BLOOD PRESSURE: 118 MMHG | HEIGHT: 71 IN | WEIGHT: 194 LBS

## 2024-08-28 DIAGNOSIS — R59.0 CERVICAL LYMPHADENOPATHY: Primary | ICD-10-CM

## 2024-08-28 DIAGNOSIS — E55.9 VITAMIN D DEFICIENCY: ICD-10-CM

## 2024-08-28 DIAGNOSIS — K21.9 GASTROESOPHAGEAL REFLUX DISEASE, UNSPECIFIED WHETHER ESOPHAGITIS PRESENT: ICD-10-CM

## 2024-08-28 PROBLEM — R00.0 HEART RATE FAST: Status: RESOLVED | Noted: 2018-06-22 | Resolved: 2024-08-28

## 2024-08-28 PROCEDURE — 99213 OFFICE O/P EST LOW 20 MIN: CPT

## 2024-08-28 NOTE — ASSESSMENT & PLAN NOTE
U/S 8/5/24:   Ovoid hypoechoic hypervascular structure in the area of concern could be due to a lymph node, although possible punctate echogenic foci are noted.  At minimum short-term follow-up ultrasound is recommended in 3 months. FNA would likely be needed for definitive diagnosis and can be performed as clinically warranted.  Patient underwent FNA yesterday, pathology in process  CBC unremarkable  Will communicate with patient results once available

## 2024-08-28 NOTE — ASSESSMENT & PLAN NOTE
Controlled with Prilosec as needed  Recommend small frequent meals throughout the day.  Avoid aggravating foods, such as spicy foods, acidic foods (ex. tomato and citrus).  Avoid alcohol.  Do not lay down for at least 30 mins after eating.

## 2024-08-28 NOTE — ASSESSMENT & PLAN NOTE
Vitamin D 35.3, not currently on supplementation.  Advised he may start multivitamin  Will continue to monitor vitamin D level, repeat 6 months

## 2024-08-28 NOTE — PROGRESS NOTES
Ambulatory Visit  Name: Alexis Rowan      : 1990      MRN: [id number]  Encounter Provider: Becky Steve PA-C  Encounter Date: 2024   Encounter department: [facility]    Assessment & Plan   1. Cervical lymphadenopathy  Assessment & Plan:  U/S 24:   Ovoid hypoechoic hypervascular structure in the area of concern could be due to a lymph node, although possible punctate echogenic foci are noted.  At minimum short-term follow-up ultrasound is recommended in 3 months. FNA would likely be needed for definitive diagnosis and can be performed as clinically warranted.  Patient underwent FNA yesterday, pathology in process  CBC unremarkable  Will communicate with patient results once available  2. Gastroesophageal reflux disease, unspecified whether esophagitis present  Assessment & Plan:  Controlled with Prilosec as needed  Recommend small frequent meals throughout the day.  Avoid aggravating foods, such as spicy foods, acidic foods (ex. tomato and citrus).  Avoid alcohol.  Do not lay down for at least 30 mins after eating.        3. Vitamin D deficiency  Assessment & Plan:  Vitamin D 35.3, not currently on supplementation.  Advised he may start multivitamin  Will continue to monitor vitamin D level, repeat 6 months       History of Present Illness     Patient is a 34-year-old male presenting to the office from follow-up  Labs reviewed with patient  CMP, lipid panel, TSH, CBC WNL  Vitamin D 35.3    GERD  Started on omeprazole 20mg daily x 2 weeks  Helpful, using prn     Lymphadenopathy  Patient underwent lymph node biopsy yesterday, 2024  Pathology in process, notes lesion has stayed same size- no increase   Denies upper respiratory symptoms, sore throat, congestion, fever, chills, night sweats, changes to appetite, unexpected weight loss or gain    U/S 24: Ovoid hypoechoic hypervascular structure in the area of concern could be due to a lymph node,  "although possible punctate echogenic foci are noted.  At minimum short-term follow-up ultrasound is recommended in 3 months. FNA would likely be needed for definitive diagnosis and can be performed as clinically warranted.            Review of Systems   Constitutional:  Negative for chills, fatigue and fever.   HENT:  Negative for congestion, ear pain, postnasal drip, rhinorrhea, sinus pressure, sore throat and trouble swallowing.    Eyes:  Negative for pain and visual disturbance.   Respiratory:  Negative for cough, chest tightness, shortness of breath and wheezing.    Cardiovascular:  Negative for chest pain, palpitations and leg swelling.   Gastrointestinal:  Negative for abdominal pain, blood in stool, diarrhea, nausea and vomiting.   Neurological:  Negative for dizziness, weakness, light-headedness, numbness and headaches.   Psychiatric/Behavioral:  The patient is nervous/anxious (surrounding pathology results).    All other systems reviewed and are negative.    Medical History Reviewed by provider this encounter:  Tobacco  Allergies  Meds  Problems  Med Hx  Surg Hx  Fam Hx       Objective     /70 (BP Location: Left arm, Patient Position: Sitting, Cuff Size: Adult)   Pulse 84   Temp 98.9 °F (37.2 °C) (Temporal)   Ht 5' 11\" (1.803 m)   Wt 88 kg (194 lb)   SpO2 98%   BMI 27.06 kg/m²     Physical Exam  Vitals and nursing note reviewed.   Constitutional:       General: He is not in acute distress.     Appearance: Normal appearance. He is not ill-appearing.   HENT:      Head: Normocephalic and atraumatic.      Right Ear: Tympanic membrane, ear canal and external ear normal.      Left Ear: Tympanic membrane, ear canal and external ear normal.      Nose: Nose normal.      Mouth/Throat:      Mouth: Mucous membranes are moist.      Pharynx: Oropharynx is clear. No posterior oropharyngeal erythema.   Eyes:      General: No scleral icterus.     Conjunctiva/sclera: Conjunctivae normal.      Pupils: " Pupils are equal, round, and reactive to light.   Cardiovascular:      Rate and Rhythm: Normal rate and regular rhythm.      Heart sounds: Normal heart sounds. No murmur heard.     No friction rub. No gallop.   Pulmonary:      Effort: Pulmonary effort is normal. No respiratory distress.      Breath sounds: Normal breath sounds. No wheezing, rhonchi or rales.   Chest:      Chest wall: No tenderness.   Musculoskeletal:      Cervical back: No tenderness.      Right lower leg: No edema.      Left lower leg: No edema.   Lymphadenopathy:      Cervical: Cervical adenopathy (right, near beard line. Smaller than previousexam) present.   Skin:     General: Skin is warm and dry.      Coloration: Skin is not pale.      Findings: No erythema.   Neurological:      General: No focal deficit present.      Mental Status: He is alert and oriented to person, place, and time. Mental status is at baseline.   Psychiatric:         Mood and Affect: Mood normal.         Behavior: Behavior normal.       Administrative Statements

## 2024-08-29 ENCOUNTER — TELEPHONE (OUTPATIENT)
Age: 34
End: 2024-08-29

## 2024-08-29 LAB — SCAN RESULT: NORMAL

## 2024-08-29 PROCEDURE — 88173 CYTOPATH EVAL FNA REPORT: CPT | Performed by: PATHOLOGY

## 2024-08-29 NOTE — TELEPHONE ENCOUNTER
Patient called and stated that his biopsy results came back and he would like a provider to review them and call him with the results. I did ask if he wanted to wait until Becky was back in the office and he asked if it could be any provider to review and call him today please.

## 2024-08-30 ENCOUNTER — E-CONSULT (OUTPATIENT)
Dept: HEMATOLOGY ONCOLOGY | Facility: CLINIC | Age: 34
End: 2024-08-30
Payer: COMMERCIAL

## 2024-08-30 ENCOUNTER — TELEPHONE (OUTPATIENT)
Age: 34
End: 2024-08-30

## 2024-08-30 ENCOUNTER — TELEMEDICINE (OUTPATIENT)
Age: 34
End: 2024-08-30
Payer: COMMERCIAL

## 2024-08-30 VITALS — WEIGHT: 194 LBS | HEIGHT: 71 IN | BODY MASS INDEX: 27.16 KG/M2

## 2024-08-30 DIAGNOSIS — R59.0 CERVICAL LYMPHADENOPATHY: Primary | ICD-10-CM

## 2024-08-30 PROCEDURE — 99213 OFFICE O/P EST LOW 20 MIN: CPT

## 2024-08-30 PROCEDURE — 99446 NTRPROF PH1/NTRNET/EHR 5-10: CPT | Performed by: PHYSICIAN ASSISTANT

## 2024-08-30 NOTE — PROGRESS NOTES
Virtual Regular Visit  Name: Alexis Rowan      : 1990      MRN: [id number]  Encounter Provider: Becky Steve PA-C  Encounter Date: 2024   Encounter department: [facility] [place]    Verification of patient location:    Patient is located at Home in the following state in which I hold an active license [place]    Assessment & Plan   1. Cervical lymphadenopathy  Assessment & Plan:  U/S 24:   Ovoid hypoechoic hypervascular structure in the area of concern could be due to a lymph node, although possible punctate echogenic foci are noted.  At minimum short-term follow-up ultrasound is recommended in 3 months. FNA would likely be needed for definitive diagnosis and can be performed as clinically warranted.  FNA 24  Soft tissue, Right submandibular mass, FNA (ThinPrep and smear preparations):  Negative for carcinoma.  Mixed lymphocytes and scattered histiocytes present, compatible with lymph node sampling.  No immunophenotypic evidence of a lymphoproliferative disorder based on a limited antibody panel ([employer]#[id number] / [id number], evaluated by Lavell Vogel M.D., Ph.D.).  Satisfactory for evaluation.   Comment: The biopsy may not be representative of the entire clinically significant lesion.  Clinical correlation is suggested. Excisional biopsy may be considered as clinically indicated.  Discussed results with patient, answered all questions  Discussed red flag symptoms to present to office sooner including fevers, chills, night sweats, abnormal weight changes, growth in size of lesion  E consult placed to hematology to discuss role for short-term follow-up versus excisional biopsy due to limited testing  Will communicate with patient consult notes once available  Discussed with Dr. Witt   Orders:  -     AMB E-CONSULT TO HEMATOLOGY         Encounter provider Becky Steve PA-C    The patient was identified by name and date of birth. Alexis Rowan was  informed that this is a telemedicine visit and that the visit is being conducted through the [employer] platform. He agrees to proceed..  My office door was closed. No one else was in the room.  He acknowledged consent and understanding of privacy and security of the video platform. The patient has agreed to participate and understands they can discontinue the visit at any time.    Patient is aware this is a billable service.     History of Present Illness     HPI    Patient is a 34-year-old male presented to the office via virtual visit to review of biopsy results  He had cervical lymphadenopathy biopsy completed 2024  He endorses no change in size, tenderness to the lesion  He is concerned because his brother  age 21 from [place]o sarcoma, he is concerned for cancer at this time    Final Diagnosis   A-B. Soft tissue, Right submandibular mass, FNA (ThinPrep and smear preparations):  Negative for carcinoma.  Mixed lymphocytes and scattered histiocytes present, compatible with lymph node sampling.  No immunophenotypic evidence of a lymphoproliferative disorder based on a limited antibody panel ([employer]#[id number] / [id number], evaluated by Lavell Vogel M.D., Ph.D.).     Satisfactory for evaluation.     Comment: The biopsy may not be representative of the entire clinically significant lesion.  Clinical correlation is suggested. Excisional biopsy may be considered as clinically indicated.        Review of Systems   Constitutional:  Negative for chills, fatigue and fever.   HENT:  Negative for congestion, postnasal drip, rhinorrhea, sinus pressure, sinus pain, sore throat and trouble swallowing.    Respiratory:  Negative for cough, chest tightness, shortness of breath and wheezing.    Cardiovascular:  Negative for chest pain and palpitations.   Gastrointestinal:  Negative for abdominal pain, constipation, diarrhea, nausea and vomiting.   Neurological:  Negative for dizziness, light-headedness and headaches.  "  Psychiatric/Behavioral:  The patient is nervous/anxious.      Medical History Reviewed by provider this encounter:       Objective     Ht 5' 11\" (1.803 m)   Wt 88 kg (194 lb)   BMI 27.06 kg/m²   Physical Exam  Vitals and nursing note reviewed.   Constitutional:       General: He is not in acute distress.     Appearance: Normal appearance. He is not ill-appearing.   HENT:      Head: Normocephalic and atraumatic.      Right Ear: External ear normal.      Left Ear: External ear normal.      Mouth/Throat:      Mouth: Mucous membranes are moist.   Eyes:      General: No scleral icterus.     Conjunctiva/sclera: Conjunctivae normal.   Pulmonary:      Effort: Pulmonary effort is normal. No respiratory distress.   Skin:     Coloration: Skin is not pale.   Neurological:      General: No focal deficit present.      Mental Status: He is alert and oriented to person, place, and time. Mental status is at baseline.   Psychiatric:         Mood and Affect: Mood normal.         Behavior: Behavior normal.         Visit Time  Total Visit Duration: 10 minutes        "

## 2024-08-30 NOTE — ASSESSMENT & PLAN NOTE
U/S 8/5/24:   Ovoid hypoechoic hypervascular structure in the area of concern could be due to a lymph node, although possible punctate echogenic foci are noted.  At minimum short-term follow-up ultrasound is recommended in 3 months. FNA would likely be needed for definitive diagnosis and can be performed as clinically warranted.  FNA 8/27/24  Soft tissue, Right submandibular mass, FNA (ThinPrep and smear preparations):  Negative for carcinoma.  Mixed lymphocytes and scattered histiocytes present, compatible with lymph node sampling.  No immunophenotypic evidence of a lymphoproliferative disorder based on a limited antibody panel ([employer]#[id number] / [id number], evaluated by Lavell Vogel M.D., Ph.D.).  Satisfactory for evaluation.   Comment: The biopsy may not be representative of the entire clinically significant lesion.  Clinical correlation is suggested. Excisional biopsy may be considered as clinically indicated.  Discussed results with patient, answered all questions  Discussed red flag symptoms to present to office sooner including fevers, chills, night sweats, abnormal weight changes, growth in size of lesion  E consult placed to hematology to discuss role for short-term follow-up versus excisional biopsy due to limited testing  Will communicate with patient consult notes once available  Discussed with Dr. Witt

## 2024-08-30 NOTE — TELEPHONE ENCOUNTER
"Called the patient to discuss E consult recommendations  \"Based on the very small nature of the lymph node with normal lymph nodes measuring up to 1 cm, and radiologist recommendation of short-term follow-up in 3 months at time of first imaging with a negative FNA biopsy, would favor a Short interval imaging at 3 months from the first ultrasound  Should there be enlargement worsening on the next ultrasound, the next referral would be to surgical oncology for lymph node excisional biopsy.\"  All questions answered for patient.  Red flag symptoms discussed.  Advised patient to schedule 3-month follow-up appointment in 3-month ultrasound  "

## 2024-08-30 NOTE — PROGRESS NOTES
E-Consult  Alexis Rowan 34 y.o. male MRN: [id number]  Encounter Date: 08/30/24      Reason for Consult / Principal Problem: lymph node enlargement in neck     Consulting Provider: Hanny Simon PA-C    Requesting Provider: Becky Steve PA-C       ASSESSMENT:  Patient had ultrasound of neck on 8/5/2024 following physical exam of enlarged lymph nodes  The ultrasound showed a hypoechoic structure measuring 1.1 x 0.4 x 1.4 cm with hypervascularity questionable few internal punctate echogenic foci versus artifact.    Radiologist recommended short-term follow-up ultrasound in 3 months versus a fine-needle aspiration.  Patient had a fine-needle aspiration which showed negative for carcinoma, mixed lymphocytes and scattered histiocytes present compatible with lymph node sampling, flow cytometry negative.    RECOMMENDATIONS:  Question is if patient should have a repeat ultrasound versus a excisional lymph node biopsy    Based on the very small nature of the lymph node with normal lymph nodes measuring up to 1 cm, and radiologist recommendation of short-term follow-up in 3 months at time of first imaging with a negative FNA biopsy, would favor a  Short interval imaging at 3 months from the first ultrasound.    Should there be enlargement worsening on the next ultrasound, the next referral would be to surgical oncology for lymph node excisional biopsy.    Total time spent 5-10 minutes, >50% of the total time devoted to medical consultative verbal/EMR discussion between providers. Written report will be generated in the EMR. .

## 2024-12-02 ENCOUNTER — RESULTS FOLLOW-UP (OUTPATIENT)
Dept: INTERNAL MEDICINE CLINIC | Facility: OTHER | Age: 34
End: 2024-12-02

## 2024-12-02 ENCOUNTER — HOSPITAL ENCOUNTER (OUTPATIENT)
Dept: RADIOLOGY | Facility: IMAGING CENTER | Age: 34
Discharge: HOME | End: 2024-12-02
Payer: COMMERCIAL

## 2024-12-02 DIAGNOSIS — R59.0 CERVICAL LYMPHADENOPATHY: ICD-10-CM

## 2024-12-02 PROCEDURE — 76536 US EXAM OF HEAD AND NECK: CPT

## 2024-12-04 ENCOUNTER — OFFICE VISIT (OUTPATIENT)
Dept: INTERNAL MEDICINE CLINIC | Facility: OTHER | Age: 34
End: 2024-12-04
Payer: COMMERCIAL

## 2024-12-04 VITALS
TEMPERATURE: 97.5 F | OXYGEN SATURATION: 99 % | WEIGHT: 195.8 LBS | SYSTOLIC BLOOD PRESSURE: 110 MMHG | DIASTOLIC BLOOD PRESSURE: 70 MMHG | HEART RATE: 79 BPM | HEIGHT: 71 IN | BODY MASS INDEX: 27.41 KG/M2

## 2024-12-04 DIAGNOSIS — R59.0 CERVICAL LYMPHADENOPATHY: ICD-10-CM
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
## 2024-12-04 DIAGNOSIS — S70.361A TICK BITE OF RIGHT THIGH, INITIAL ENCOUNTER: ICD-10-CM

## 2024-12-04 DIAGNOSIS — K21.9 GASTROESOPHAGEAL REFLUX DISEASE, UNSPECIFIED WHETHER ESOPHAGITIS PRESENT: Primary | ICD-10-CM

## 2024-12-04 PROCEDURE — 99214 OFFICE O/P EST MOD 30 MIN: CPT | Performed by: NURSE PRACTITIONER

## 2024-12-04 RX ORDER — FAMOTIDINE 20 MG/1
20 TABLET, FILM COATED ORAL
Qty: 90 TABLET | Refills: 0 | Status: SHIPPED | OUTPATIENT
Start: 2024-12-04 | End: 2025-11-29

## 2024-12-04 NOTE — ASSESSMENT & PLAN NOTE
Does not meet criteria for prophylactic Doxy  Advised to use bacitracin once daily to affected site, keep clean with mild soap and water  Notify office of any increased redness or rash  Check Lyme test in 6 weeks

## 2024-12-04 NOTE — ASSESSMENT & PLAN NOTE
-Negative FNA biopsy 8/27/2024  -Reviewed heme-onc E consult.  Recommended 3-month follow-up ultrasound and if worsening refer to surgical oncology for excisional lymph node biopsy  -3-month follow-up ultrasound is improved showing stable subcentimeter short axis right submandibular lymph node.  Prior hypervascularity is not seen  -Will repeat ultrasound again in 6 months, sooner as needed for any clinical changes

## 2024-12-04 NOTE — ASSESSMENT & PLAN NOTE
Symptoms previously controlled with PPI as needed  Switch to famotidine 20 mg as needed  Continue diet modifications and avoiding trigger foods

## 2024-12-04 NOTE — PROGRESS NOTES
Assessment/Plan:    Problem List Items Addressed This Visit       Cervical lymphadenopathy    -Negative FNA biopsy 8/27/2024  -Reviewed heme-onc E consult.  Recommended 3-month follow-up ultrasound and if worsening refer to surgical oncology for excisional lymph node biopsy  -3-month follow-up ultrasound is improved showing stable subcentimeter short axis right submandibular lymph node.  Prior hypervascularity is not seen  -Will repeat ultrasound again in 6 months, sooner as needed for any clinical changes           Relevant Orders    US head neck soft tissue    Gastroesophageal reflux disease - Primary    Symptoms previously controlled with PPI as needed  Switch to famotidine 20 mg as needed  Continue diet modifications and avoiding trigger foods         Relevant Medications    famotidine (PEPCID) 20 mg tablet    Tick bite of right thigh    Does not meet criteria for prophylactic Doxy  Advised to use bacitracin once daily to affected site, keep clean with mild soap and water  Notify office of any increased redness or rash  Check Lyme test in 6 weeks         Relevant Orders    Lyme Total AB W Reflex to IGM/IGG       BMI Counseling: Body mass index is 27.31 kg/m².        M*col[employer] software was used to dictate this note.  It may contain errors with dictating incorrect words or incorrect spelling. Please contact the provider directly with any questions.    Subjective:      Patient ID: Alexis Rowan is a 34 y.o. male.    HPI    Patient presents today for 3-month follow-up of cervical lymphadenopathy.  Initial ultrasound in August showed:  8/5/2024  IMPRESSION:  Ovoid hypoechoic hypervascular structure in the area of concern could be due to a lymph node, although possible punctate echogenic foci are noted.  At minimum short-term follow-up ultrasound is recommended in 3 months. FNA would likely be needed for definitive diagnosis and can be performed as clinically warranted.    Patient opted for fine-needle biopsy  "(8/27/2024) which was benign, mixed lymphocytes and scattered histiocytes present, compatible with lymph node sampling.  E consult was placed for hematology oncology which was completed 8/30/2024  Recommendations copied from consult  \"RECOMMENDATIONS:  Question is if patient should have a repeat ultrasound versus a excisional lymph node biopsy     Based on the very small nature of the lymph node with normal lymph nodes measuring up to 1 cm, and radiologist recommendation of short-term follow-up in 3 months at time of first imaging with a negative FNA biopsy, would favor a  Short interval imaging at 3 months from the first ultrasound.     Should there be enlargement worsening on the next ultrasound, the next referral would be to surgical oncology for lymph node excisional biopsy.\" Hanny Simon PA-C    He recently underwent his 3-month follow-up ultrasound head and neck soft tissue which showed a stable subcentimeter short axis right submandibular lymph node.  Prior hypervascularity is not seen on current study.     He states that occasionally he will have tenderness in the area or a pulling sensation. He states that as quickly as he feels it, it goes away. He states he feels like it has a \"tail and is stretching it\"  Symptoms occur about once every 1-2 weeks.     He mentions that he was bit by a tick 4-5 days ago. He states there was a small area of redness and he thinks he noticed a small ring but this is now resolved. He denies any fatigue, joint pain, neck pain. He states the tick was not engorged/     The following portions of the patient's history were reviewed and updated as appropriate: allergies, current medications, past family history, past medical history, past social history, past surgical history, and problem list.    Review of Systems   Constitutional:  Negative for chills and fever.   Musculoskeletal:  Negative for neck pain and neck stiffness.   Skin:  Positive for wound. Negative for rash. " "  Hematological:  Positive for adenopathy.         Past Medical History:   Diagnosis Date    Allergic     GERD (gastroesophageal reflux disease)     Sleep apnea     Vitamin D deficiency          Current Outpatient Medications:     famotidine (PEPCID) 20 mg tablet, Take 1 tablet (20 mg total) by mouth daily at bedtime, Disp: 90 tablet, Rfl: 0    omeprazole (PriLOSEC) 20 mg delayed release capsule, Take 1 capsule (20 mg total) by mouth daily, Disp: 20 capsule, Rfl: 0    Allergies   Allergen Reactions    Sulfa Antibiotics Hives       Social History   Past Surgical History:   Procedure Laterality Date    APPENDECTOMY      CLOSED REDUCTION NASAL FRACTURE      US GUIDED LYMPH NODE BIOPSY RIGHT  8/27/2024     Family History   Problem Relation Age of Onset    Bone cancer Brother     Thyroid cancer Maternal Grandmother     Diabetes Maternal Grandfather     Diabetes Cousin     COPD Father     Stroke Paternal Grandfather     Diabetes Paternal Grandfather        Objective:  /70 (BP Location: Left arm, Patient Position: Sitting, Cuff Size: Standard)   Pulse 79   Temp 97.5 °F (36.4 °C) (Temporal)   Ht 5' 11\" (1.803 m)   Wt 88.8 kg (195 lb 12.8 oz)   SpO2 99%   BMI 27.31 kg/m²      Physical Exam  Vitals reviewed.   Constitutional:       General: He is not in acute distress.     Appearance: Normal appearance. He is not diaphoretic.   HENT:      Head: Normocephalic and atraumatic.   Eyes:      Extraocular Movements: Extraocular movements intact.      Conjunctiva/sclera: Conjunctivae normal.      Pupils: Pupils are equal, round, and reactive to light.   Cardiovascular:      Rate and Rhythm: Normal rate and regular rhythm.      Heart sounds: Normal heart sounds. No murmur heard.  Pulmonary:      Effort: Pulmonary effort is normal. No respiratory distress.      Breath sounds: Normal breath sounds. No wheezing, rhonchi or rales.   Skin:     Comments: Site of tick bite right inner thigh, see photo   Neurological:      Mental " Status: He is alert and oriented to person, place, and time. Mental status is at baseline.   Psychiatric:         Mood and Affect: Mood normal.         Behavior: Behavior normal.         Thought Content: Thought content normal.         Judgment: Judgment normal.

## 2025-02-13 ENCOUNTER — OFFICE VISIT (OUTPATIENT)
Dept: INTERNAL MEDICINE CLINIC | Facility: OTHER | Age: 35
End: 2025-02-13
Payer: COMMERCIAL

## 2025-02-13 VITALS
OXYGEN SATURATION: 97 % | SYSTOLIC BLOOD PRESSURE: 110 MMHG | TEMPERATURE: 97.4 F | HEART RATE: 94 BPM | WEIGHT: 196.8 LBS | BODY MASS INDEX: 27.45 KG/M2 | DIASTOLIC BLOOD PRESSURE: 70 MMHG

## 2025-02-13 DIAGNOSIS — R09.A2 GLOBUS SENSATION: ICD-10-CM

## 2025-02-13 DIAGNOSIS — R59.0 CERVICAL LYMPHADENOPATHY: Primary | ICD-10-CM

## 2025-02-13 DIAGNOSIS — K21.9 GASTROESOPHAGEAL REFLUX DISEASE, UNSPECIFIED WHETHER ESOPHAGITIS PRESENT: ICD-10-CM

## 2025-02-13 DIAGNOSIS — E07.89 THYROID FULLNESS: ICD-10-CM

## 2025-02-13 PROBLEM — S70.361A TICK BITE OF RIGHT THIGH: Status: RESOLVED | Noted: 2024-12-04 | Resolved: 2025-02-13
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
## 2025-02-13 PROBLEM — Z00.00 ANNUAL PHYSICAL EXAM: Status: RESOLVED | Noted: 2024-07-31 | Resolved: 2025-02-13

## 2025-02-13 PROCEDURE — 99214 OFFICE O/P EST MOD 30 MIN: CPT | Performed by: NURSE PRACTITIONER

## 2025-02-13 RX ORDER — FAMOTIDINE 20 MG/1
20 TABLET, FILM COATED ORAL
Qty: 90 TABLET | Refills: 1 | Status: SHIPPED | OUTPATIENT
Start: 2025-02-13 | End: 2026-02-08

## 2025-02-13 NOTE — ASSESSMENT & PLAN NOTE
-negative FNA biopsy 8/27/24  -previously seen by hem/onc, recommended 3 month f/u US. F/u US in Dec 2024 showed stable subcentimeter short axis right submandibular lymph node. Prior hypervascularity was no longer seen.   -pt with ongoing discomfort and pulling sensation in the area of the enlarged lymph node. He also endorses a globus sensation with swallowing  -will check US head/neck and include thyroid  -referral made to ENT and surgical oncology  -patient evaluated with Dr Alvarez

## 2025-02-13 NOTE — PROGRESS NOTES
"Name: Alexis Rowan      : 1990      MRN: [id number]  Encounter Provider: MAE Hammer  Encounter Date: 2025   Encounter department: [facility]  :  Assessment & Plan  Gastroesophageal reflux disease, unspecified whether esophagitis present  Continue famotidine       Cervical lymphadenopathy  -negative FNA biopsy 24  -previously seen by hem/onc, recommended 3 month f/u US. F/u US in Dec 2024 showed stable subcentimeter short axis right submandibular lymph node. Prior hypervascularity was no longer seen.   -pt with ongoing discomfort and pulling sensation in the area of the enlarged lymph node. He also endorses a globus sensation with swallowing  -will check US head/neck and include thyroid  -referral made to ENT and surgical oncology  -patient evaluated with Dr Alvarez              History of Present Illness   HPI  Patient presents today with concern for his enlarged lymph node. He states he has always had a \"pulling\" sensation in that area, but it has become more pronounced and more frequent. He notices its every few days. He also has a globus sensation when swallowing. He has concern as it was enlarged in the past. He had the lymph node biopsied which was benign but he wished to have another follow up in about 6 months.     Review of Systems   Constitutional:  Negative for chills and fever.   HENT:  Negative for congestion, ear pain, postnasal drip, rhinorrhea, sinus pressure, sinus pain and trouble swallowing (globus sensation).    Hematological:  Positive for adenopathy.       Objective   /70 (BP Location: Left arm, Patient Position: Sitting, Cuff Size: Standard)   Pulse 94   Temp (!) 97.4 °F (36.3 °C) (Temporal)   Wt 89.3 kg (196 lb 12.8 oz)   SpO2 97%   BMI 27.45 kg/m²      Physical Exam  Vitals reviewed.   Constitutional:       General: He is not in acute distress.     Appearance: Normal appearance. He is not diaphoretic. "   HENT:      Head: Normocephalic and atraumatic.      Right Ear: Tympanic membrane and external ear normal.      Left Ear: Tympanic membrane and external ear normal.      Nose:      Right Turbinates: Swollen.      Left Turbinates: Swollen.      Mouth/Throat:      Mouth: Mucous membranes are moist.      Pharynx: Oropharynx is clear. No oropharyngeal exudate or posterior oropharyngeal erythema.   Eyes:      Extraocular Movements: Extraocular movements intact.      Conjunctiva/sclera: Conjunctivae normal.      Pupils: Pupils are equal, round, and reactive to light.   Neck:      Thyroid: No thyroid tenderness.   Cardiovascular:      Rate and Rhythm: Normal rate and regular rhythm.      Heart sounds: Normal heart sounds.   Pulmonary:      Effort: Pulmonary effort is normal. No respiratory distress.      Breath sounds: Normal breath sounds. No wheezing, rhonchi or rales.   Lymphadenopathy:      Cervical: Cervical adenopathy present.   Neurological:      Mental Status: He is alert and oriented to person, place, and time. Mental status is at baseline.   Psychiatric:         Mood and Affect: Mood normal.         Behavior: Behavior normal.

## 2025-02-20 ENCOUNTER — HOSPITAL ENCOUNTER (OUTPATIENT)
Dept: RADIOLOGY | Facility: IMAGING CENTER | Age: 35
End: 2025-02-20
Payer: COMMERCIAL

## 2025-02-20 DIAGNOSIS — E07.89 THYROID FULLNESS: ICD-10-CM

## 2025-02-20 DIAGNOSIS — R09.A2 GLOBUS SENSATION: ICD-10-CM

## 2025-02-20 DIAGNOSIS — R59.0 CERVICAL LYMPHADENOPATHY: ICD-10-CM

## 2025-02-20 PROCEDURE — 76536 US EXAM OF HEAD AND NECK: CPT

## 2025-02-21 ENCOUNTER — RESULTS FOLLOW-UP (OUTPATIENT)
Dept: INTERNAL MEDICINE CLINIC | Facility: OTHER | Age: 35
End: 2025-02-21

## 2025-04-04 ENCOUNTER — NURSE TRIAGE (OUTPATIENT)
Age: 35
End: 2025-04-04

## 2025-04-04 NOTE — TELEPHONE ENCOUNTER
"FOLLOW UP: Pt scheduled Monday. Discussed Pt to call back with worsening of symptoms. Proceed to Urgent Care if he feels he needs to be seen sooner. Pt verbalized understanding and is agreeable with plan. Pt is comfortable with appt on Monday as of now.    REASON FOR CONVERSATION: Nasal Congestion    SYMPTOMS: see below    OTHER: Triage forwarded to office for PCP review    DISPOSITION: See PCP Within 3 Days in Office            Answer Assessment - Initial Assessment Questions  1. LOCATION: \"Where does it hurt?\"       Pt does not report sinus pain. Reports allergy like congestion and discomfort in chest ONLY WHEN COUGHING.     2. NASAL DISCHARGE: \"Do you have discharge from your nose?\" If so ask, \"What color?\"      Brownish, small amount    3. FEVER: \"Do you have a fever?\" If Yes, ask: \"What is it, how was it measured, and when did it start?\"       Denies fever.    4. OTHER SYMPTOMS: \"Do you have any other symptoms?\" (e.g., sore throat, cough, earache, difficulty breathing)      Cough    Protocols used: Sinus Pain or Congestion-Adult-[place]    "

## 2025-04-04 NOTE — TELEPHONE ENCOUNTER
Regarding: allergies chest congestion  ----- Message from Urmial HILL sent at 4/4/2025  8:51 AM EDT -----  Patient called complaining of severe allergies and chest congestion where it hurts when he coughs and feels a little bit of tightness in his chest. There were no appointments available at any of the [facility] offices today. I was able to schedule him on monday at [place] with roberto brock at 3:00 PM.  But if someone would like to follow up with him about these symptoms please     thank you

## 2025-07-31 ENCOUNTER — OFFICE VISIT (OUTPATIENT)
Dept: INTERNAL MEDICINE CLINIC | Facility: OTHER | Age: 35
End: 2025-07-31
Payer: COMMERCIAL

## 2025-07-31 VITALS
HEIGHT: 71 IN | HEART RATE: 79 BPM | DIASTOLIC BLOOD PRESSURE: 70 MMHG | SYSTOLIC BLOOD PRESSURE: 108 MMHG | WEIGHT: 202 LBS | TEMPERATURE: 97.1 F | BODY MASS INDEX: 28.28 KG/M2 | OXYGEN SATURATION: 97 %

## 2025-07-31 DIAGNOSIS — R00.2 PALPITATIONS: Primary | ICD-10-CM

## 2025-07-31 DIAGNOSIS — Z30.09 VASECTOMY EVALUATION: ICD-10-CM

## 2025-07-31 PROCEDURE — 99213 OFFICE O/P EST LOW 20 MIN: CPT | Performed by: NURSE PRACTITIONER

## 2025-08-06 ENCOUNTER — TELEPHONE (OUTPATIENT)
Age: 35
End: 2025-08-06